# Patient Record
Sex: MALE | Race: BLACK OR AFRICAN AMERICAN | NOT HISPANIC OR LATINO | Employment: OTHER | URBAN - METROPOLITAN AREA
[De-identification: names, ages, dates, MRNs, and addresses within clinical notes are randomized per-mention and may not be internally consistent; named-entity substitution may affect disease eponyms.]

---

## 2022-02-07 ENCOUNTER — HOSPITAL ENCOUNTER (INPATIENT)
Facility: HOSPITAL | Age: 72
LOS: 3 days | Discharge: HOME | End: 2022-02-10
Attending: EMERGENCY MEDICINE | Admitting: INTERNAL MEDICINE

## 2022-02-07 ENCOUNTER — APPOINTMENT (OUTPATIENT)
Dept: GENERAL RADIOLOGY | Facility: HOSPITAL | Age: 72
End: 2022-02-07

## 2022-02-07 ENCOUNTER — APPOINTMENT (OUTPATIENT)
Dept: CT IMAGING | Facility: HOSPITAL | Age: 72
End: 2022-02-07

## 2022-02-07 DIAGNOSIS — I26.94 MULTIPLE SUBSEGMENTAL PULMONARY EMBOLI WITHOUT ACUTE COR PULMONALE: Primary | ICD-10-CM

## 2022-02-07 DIAGNOSIS — J12.82 PNEUMONIA DUE TO COVID-19 VIRUS: ICD-10-CM

## 2022-02-07 DIAGNOSIS — U07.1 PNEUMONIA DUE TO COVID-19 VIRUS: ICD-10-CM

## 2022-02-07 LAB
ALBUMIN SERPL-MCNC: 3.2 G/DL (ref 3.5–5.2)
ALBUMIN/GLOB SERPL: 0.8 G/DL
ALP SERPL-CCNC: 61 U/L (ref 39–117)
ALT SERPL W P-5'-P-CCNC: 14 U/L (ref 1–41)
ANION GAP SERPL CALCULATED.3IONS-SCNC: 8 MMOL/L (ref 5–15)
APTT PPP: 152.8 SECONDS (ref 22.7–35.4)
APTT PPP: 31.8 SECONDS (ref 22.7–35.4)
AST SERPL-CCNC: 17 U/L (ref 1–40)
B PARAPERT DNA SPEC QL NAA+PROBE: NOT DETECTED
B PERT DNA SPEC QL NAA+PROBE: NOT DETECTED
BASOPHILS # BLD AUTO: 0.03 10*3/MM3 (ref 0–0.2)
BASOPHILS NFR BLD AUTO: 1 % (ref 0–1.5)
BILIRUB SERPL-MCNC: 0.8 MG/DL (ref 0–1.2)
BUN SERPL-MCNC: 14 MG/DL (ref 8–23)
BUN/CREAT SERPL: 8.6 (ref 7–25)
C PNEUM DNA NPH QL NAA+NON-PROBE: NOT DETECTED
CALCIUM SPEC-SCNC: 9.2 MG/DL (ref 8.6–10.5)
CHLORIDE SERPL-SCNC: 101 MMOL/L (ref 98–107)
CO2 SERPL-SCNC: 27 MMOL/L (ref 22–29)
CREAT SERPL-MCNC: 1.63 MG/DL (ref 0.76–1.27)
D DIMER PPP FEU-MCNC: 2.02 MCGFEU/ML (ref 0–0.49)
D-LACTATE SERPL-SCNC: 1.1 MMOL/L (ref 0.5–2)
DEPRECATED RDW RBC AUTO: 43.3 FL (ref 37–54)
EOSINOPHIL # BLD AUTO: 0.02 10*3/MM3 (ref 0–0.4)
EOSINOPHIL NFR BLD AUTO: 0.7 % (ref 0.3–6.2)
ERYTHROCYTE [DISTWIDTH] IN BLOOD BY AUTOMATED COUNT: 14.2 % (ref 12.3–15.4)
FLUAV SUBTYP SPEC NAA+PROBE: NOT DETECTED
FLUBV RNA ISLT QL NAA+PROBE: NOT DETECTED
GFR SERPL CREATININE-BSD FRML MDRD: 50 ML/MIN/1.73
GLOBULIN UR ELPH-MCNC: 4 GM/DL
GLUCOSE SERPL-MCNC: 95 MG/DL (ref 65–99)
HADV DNA SPEC NAA+PROBE: NOT DETECTED
HCOV 229E RNA SPEC QL NAA+PROBE: NOT DETECTED
HCOV HKU1 RNA SPEC QL NAA+PROBE: NOT DETECTED
HCOV NL63 RNA SPEC QL NAA+PROBE: NOT DETECTED
HCOV OC43 RNA SPEC QL NAA+PROBE: NOT DETECTED
HCT VFR BLD AUTO: 40 % (ref 37.5–51)
HGB BLD-MCNC: 13.3 G/DL (ref 13–17.7)
HMPV RNA NPH QL NAA+NON-PROBE: NOT DETECTED
HPIV1 RNA ISLT QL NAA+PROBE: NOT DETECTED
HPIV2 RNA SPEC QL NAA+PROBE: NOT DETECTED
HPIV3 RNA NPH QL NAA+PROBE: NOT DETECTED
HPIV4 P GENE NPH QL NAA+PROBE: NOT DETECTED
IMM GRANULOCYTES # BLD AUTO: 0.01 10*3/MM3 (ref 0–0.05)
IMM GRANULOCYTES NFR BLD AUTO: 0.3 % (ref 0–0.5)
INR PPP: 1 (ref 0.9–1.1)
LYMPHOCYTES # BLD AUTO: 0.56 10*3/MM3 (ref 0.7–3.1)
LYMPHOCYTES NFR BLD AUTO: 19.4 % (ref 19.6–45.3)
M PNEUMO IGG SER IA-ACNC: NOT DETECTED
MCH RBC QN AUTO: 28.4 PG (ref 26.6–33)
MCHC RBC AUTO-ENTMCNC: 33.3 G/DL (ref 31.5–35.7)
MCV RBC AUTO: 85.3 FL (ref 79–97)
MONOCYTES # BLD AUTO: 0.4 10*3/MM3 (ref 0.1–0.9)
MONOCYTES NFR BLD AUTO: 13.9 % (ref 5–12)
NEUTROPHILS NFR BLD AUTO: 1.86 10*3/MM3 (ref 1.7–7)
NEUTROPHILS NFR BLD AUTO: 64.7 % (ref 42.7–76)
NRBC BLD AUTO-RTO: 0 /100 WBC (ref 0–0.2)
NT-PROBNP SERPL-MCNC: 589 PG/ML (ref 0–900)
PLATELET # BLD AUTO: 165 10*3/MM3 (ref 140–450)
PMV BLD AUTO: 10.5 FL (ref 6–12)
POTASSIUM SERPL-SCNC: 4.3 MMOL/L (ref 3.5–5.2)
PROCALCITONIN SERPL-MCNC: 0.23 NG/ML (ref 0–0.25)
PROT SERPL-MCNC: 7.2 G/DL (ref 6–8.5)
PROTHROMBIN TIME: 13.1 SECONDS (ref 11.7–14.2)
QT INTERVAL: 368 MS
RBC # BLD AUTO: 4.69 10*6/MM3 (ref 4.14–5.8)
RHINOVIRUS RNA SPEC NAA+PROBE: NOT DETECTED
RSV RNA NPH QL NAA+NON-PROBE: NOT DETECTED
SARS-COV-2 RNA NPH QL NAA+NON-PROBE: DETECTED
SODIUM SERPL-SCNC: 136 MMOL/L (ref 136–145)
TROPONIN T SERPL-MCNC: <0.01 NG/ML (ref 0–0.03)
WBC NRBC COR # BLD: 2.88 10*3/MM3 (ref 3.4–10.8)

## 2022-02-07 PROCEDURE — 84484 ASSAY OF TROPONIN QUANT: CPT | Performed by: EMERGENCY MEDICINE

## 2022-02-07 PROCEDURE — 83880 ASSAY OF NATRIURETIC PEPTIDE: CPT | Performed by: EMERGENCY MEDICINE

## 2022-02-07 PROCEDURE — 85730 THROMBOPLASTIN TIME PARTIAL: CPT | Performed by: INTERNAL MEDICINE

## 2022-02-07 PROCEDURE — 71275 CT ANGIOGRAPHY CHEST: CPT

## 2022-02-07 PROCEDURE — 87040 BLOOD CULTURE FOR BACTERIA: CPT | Performed by: EMERGENCY MEDICINE

## 2022-02-07 PROCEDURE — 25010000002 HEPARIN (PORCINE) 25000-0.45 UT/250ML-% SOLUTION: Performed by: EMERGENCY MEDICINE

## 2022-02-07 PROCEDURE — 85730 THROMBOPLASTIN TIME PARTIAL: CPT | Performed by: EMERGENCY MEDICINE

## 2022-02-07 PROCEDURE — 0 IOPAMIDOL PER 1 ML: Performed by: EMERGENCY MEDICINE

## 2022-02-07 PROCEDURE — 0202U NFCT DS 22 TRGT SARS-COV-2: CPT | Performed by: EMERGENCY MEDICINE

## 2022-02-07 PROCEDURE — 71045 X-RAY EXAM CHEST 1 VIEW: CPT

## 2022-02-07 PROCEDURE — 25010000002 HEPARIN (PORCINE) PER 1000 UNITS: Performed by: EMERGENCY MEDICINE

## 2022-02-07 PROCEDURE — 85025 COMPLETE CBC W/AUTO DIFF WBC: CPT | Performed by: EMERGENCY MEDICINE

## 2022-02-07 PROCEDURE — 85379 FIBRIN DEGRADATION QUANT: CPT | Performed by: EMERGENCY MEDICINE

## 2022-02-07 PROCEDURE — 99285 EMERGENCY DEPT VISIT HI MDM: CPT

## 2022-02-07 PROCEDURE — 85610 PROTHROMBIN TIME: CPT | Performed by: EMERGENCY MEDICINE

## 2022-02-07 PROCEDURE — 83605 ASSAY OF LACTIC ACID: CPT | Performed by: EMERGENCY MEDICINE

## 2022-02-07 PROCEDURE — 93010 ELECTROCARDIOGRAM REPORT: CPT | Performed by: INTERNAL MEDICINE

## 2022-02-07 PROCEDURE — 84145 PROCALCITONIN (PCT): CPT | Performed by: EMERGENCY MEDICINE

## 2022-02-07 PROCEDURE — 93005 ELECTROCARDIOGRAM TRACING: CPT | Performed by: EMERGENCY MEDICINE

## 2022-02-07 PROCEDURE — 80053 COMPREHEN METABOLIC PANEL: CPT | Performed by: EMERGENCY MEDICINE

## 2022-02-07 RX ORDER — NITROGLYCERIN 0.4 MG/1
0.4 TABLET SUBLINGUAL
Status: DISCONTINUED | OUTPATIENT
Start: 2022-02-07 | End: 2022-02-10 | Stop reason: HOSPADM

## 2022-02-07 RX ORDER — HEPARIN SODIUM 10000 [USP'U]/100ML
18 INJECTION, SOLUTION INTRAVENOUS
Status: DISCONTINUED | OUTPATIENT
Start: 2022-02-07 | End: 2022-02-09

## 2022-02-07 RX ORDER — UREA 10 %
3 LOTION (ML) TOPICAL NIGHTLY PRN
Status: DISCONTINUED | OUTPATIENT
Start: 2022-02-07 | End: 2022-02-10 | Stop reason: HOSPADM

## 2022-02-07 RX ORDER — AMLODIPINE BESYLATE 10 MG/1
10 TABLET ORAL DAILY
COMMUNITY

## 2022-02-07 RX ORDER — HEPARIN SODIUM 5000 [USP'U]/ML
80 INJECTION, SOLUTION INTRAVENOUS; SUBCUTANEOUS ONCE
Status: COMPLETED | OUTPATIENT
Start: 2022-02-07 | End: 2022-02-07

## 2022-02-07 RX ORDER — ONDANSETRON 4 MG/1
4 TABLET, FILM COATED ORAL EVERY 6 HOURS PRN
Status: DISCONTINUED | OUTPATIENT
Start: 2022-02-07 | End: 2022-02-10 | Stop reason: HOSPADM

## 2022-02-07 RX ORDER — ACETAMINOPHEN 325 MG/1
650 TABLET ORAL EVERY 4 HOURS PRN
Status: DISCONTINUED | OUTPATIENT
Start: 2022-02-07 | End: 2022-02-10 | Stop reason: HOSPADM

## 2022-02-07 RX ORDER — PANTOPRAZOLE SODIUM 40 MG/1
40 TABLET, DELAYED RELEASE ORAL
COMMUNITY

## 2022-02-07 RX ORDER — ONDANSETRON 2 MG/ML
4 INJECTION INTRAMUSCULAR; INTRAVENOUS EVERY 6 HOURS PRN
Status: DISCONTINUED | OUTPATIENT
Start: 2022-02-07 | End: 2022-02-10 | Stop reason: HOSPADM

## 2022-02-07 RX ORDER — SODIUM CHLORIDE 0.9 % (FLUSH) 0.9 %
10 SYRINGE (ML) INJECTION AS NEEDED
Status: DISCONTINUED | OUTPATIENT
Start: 2022-02-07 | End: 2022-02-10 | Stop reason: HOSPADM

## 2022-02-07 RX ORDER — SENNA PLUS 8.6 MG/1
2 TABLET ORAL 2 TIMES DAILY
Status: DISCONTINUED | OUTPATIENT
Start: 2022-02-07 | End: 2022-02-10 | Stop reason: HOSPADM

## 2022-02-07 RX ORDER — ERGOCALCIFEROL 1.25 MG/1
50000 CAPSULE ORAL WEEKLY
COMMUNITY

## 2022-02-07 RX ORDER — HEPARIN SODIUM 5000 [USP'U]/ML
40-80 INJECTION, SOLUTION INTRAVENOUS; SUBCUTANEOUS EVERY 6 HOURS PRN
Status: DISCONTINUED | OUTPATIENT
Start: 2022-02-07 | End: 2022-02-09

## 2022-02-07 RX ADMIN — HEPARIN SODIUM 18 UNITS/KG/HR: 10000 INJECTION, SOLUTION INTRAVENOUS at 15:00

## 2022-02-07 RX ADMIN — SENNOSIDES 2 TABLET: 8.6 TABLET, FILM COATED ORAL at 23:22

## 2022-02-07 RX ADMIN — IOPAMIDOL 95 ML: 755 INJECTION, SOLUTION INTRAVENOUS at 13:50

## 2022-02-07 RX ADMIN — Medication 3 MG: at 23:22

## 2022-02-07 RX ADMIN — HEPARIN SODIUM 6500 UNITS: 5000 INJECTION, SOLUTION INTRAVENOUS; SUBCUTANEOUS at 14:58

## 2022-02-07 NOTE — CASE MANAGEMENT/SOCIAL WORK
Late entry- Notified by registration patient has [facility]- updated ER provider- Medicare A/VA benefits- Contacted VA line for approval #[id number] to stay at [facility]. Cheri Russ RN

## 2022-02-07 NOTE — ED NOTES
Pt wearing mask. Myself had mask, and eye wear/PPE when present with pt. Hand hygiene performed before and after pt care.     Yamile Tong, PCT  02/07/22 2861

## 2022-02-07 NOTE — ED NOTES
Pt states that his son, whom he lives with, had Covid roughly 1 month ago.  Pt states he has had a cough and increased fatigue for the past 2 weeks.     Yamila Gates, RN  02/07/22 6230

## 2022-02-07 NOTE — ED TRIAGE NOTES
Pt is soa and is tired when he gets up from sitting/laying - he feels like he's going to pass out.  He coughs c phlegm    Patient was placed in face mask during first look triage.  Patient was wearing a face mask throughout encounter.  I wore personal protective equipment throughout the encounter.  Hand hygiene was performed before and after patient encounter.

## 2022-02-07 NOTE — ED NOTES
Updated patient's wife.  Patient's wife asked if she could come and see her  through a window.  I notified the wife that Covid + patients are not allowed visitors.  Wife stated that a family member who was Covid + was allowed to have a visitor look at her through a window and pass notes back and forth.  I explained to the wife that I was not sure what that circumstance was, but at this point her  cannot have visitors.    Wife requests that staff help him charge and use his cell phone.     Yamila Gates RN  02/07/22 [id number]

## 2022-02-07 NOTE — ED PROVIDER NOTES
EMERGENCY DEPARTMENT ENCOUNTER  I wore full protective equipment throughout this patient encounter including a N95 mask, eye shield, gown and gloves. Hand hygiene was performed before donning protective equipment and after removal when leaving the room.    Room Number:  40/40  Date of encounter:  2/7/2022  PCP: Provider, No Known    HPI:  Context: David Nugent is a 72 y.o. male who presents to the ED c/o chief complaint of increasing dyspnea on exertion for the past 3 weeks.  Patient denies history of congestive heart failure but he has noted increasing shortness of breath when he goes to the grocery.  He denies chest pain or diaphoresis with exertion.  He is also noted for the past 2 weeks, a cough productive of yellow sputum.  He denies leg pain or leg swelling.  He states he did have a pulmonary embolism last year and is no longer on blood thinners.  He states this does not feel like the last time he had a PE.  Patient states he is not immunized against SARS-CoV-2.  He does admit for 2 weeks of subjective chills and night sweats.  He states he is here today to make sure he does not have pneumonia or pulmonary embolism.    MEDICAL HISTORY REVIEW  Reviewed in [facility]    PAST MEDICAL HISTORY  Active Ambulatory Problems     Diagnosis Date Noted   • No Active Ambulatory Problems     Resolved Ambulatory Problems     Diagnosis Date Noted   • No Resolved Ambulatory Problems     No Additional Past Medical History       PAST SURGICAL HISTORY  No past surgical history on file.    FAMILY HISTORY  No family history on file.    SOCIAL HISTORY  Social History     Socioeconomic History   • Marital status:        ALLERGIES  Patient has no known allergies.    The patient's allergies have been reviewed    REVIEW OF SYSTEMS  All systems reviewed and negative except for those discussed in HPI.     PHYSICAL EXAM  I have reviewed the triage vital signs and nursing notes.  ED Triage Vitals   Temp Heart Rate Resp BP SpO2   02/07/22  1022 02/07/22 1022 02/07/22 1022 02/07/22 1121 02/07/22 1022   97.7 °F (36.5 °C) 83 16 141/83 94 %      Temp src Heart Rate Source Patient Position BP Location FiO2 (%)   02/07/22 1022 02/07/22 1022 02/07/22 1121 02/07/22 1121 --   Tympanic Monitor Lying Right arm        General: Mild distress.  HENT: NCAT, PERRL, Nares patent.  Eyes: no scleral icterus.  Extraocular movements are intact.    Neck: trachea midline, no ROM limitations.  No lymphadenopathy or JVD.  CV: regular rhythm, regular rate.  Respiratory: normal effort, right basilar rhonchi are noted.  Abdomen: soft, nondistended, NTTP, no rebound tenderness, no guarding or rigidity.  Musculoskeletal: no deformity.  No edema or calf tenderness noted.    Neuro: alert, moves all extremities, follows commands.  Skin: warm, dry.    LAB RESULTS  Recent Results (from the past 24 hour(s))   Respiratory Panel PCR w/COVID-19(SARS-CoV-2) RODGER/LYNNE/JASON/PAD/COR/MAD/DURAN In-House, NP Swab in UTM/VTM, 3-4 HR TAT - Swab, Nasopharynx    Collection Time: 02/07/22 11:23 AM    Specimen: Nasopharynx; Swab   Result Value Ref Range    ADENOVIRUS, PCR Not Detected Not Detected    Coronavirus 229E Not Detected Not Detected    Coronavirus HKU1 Not Detected Not Detected    Coronavirus NL63 Not Detected Not Detected    Coronavirus OC43 Not Detected Not Detected    COVID19 Detected (C) Not Detected - Ref. Range    Human Metapneumovirus Not Detected Not Detected    Human Rhinovirus/Enterovirus Not Detected Not Detected    Influenza A PCR Not Detected Not Detected    Influenza B PCR Not Detected Not Detected    Parainfluenza Virus 1 Not Detected Not Detected    Parainfluenza Virus 2 Not Detected Not Detected    Parainfluenza Virus 3 Not Detected Not Detected    Parainfluenza Virus 4 Not Detected Not Detected    RSV, PCR Not Detected Not Detected    Bordetella pertussis pcr Not Detected Not Detected    Bordetella parapertussis PCR Not Detected Not Detected    Chlamydophila pneumoniae PCR Not  Detected Not Detected    Mycoplasma pneumo by PCR Not Detected Not Detected   Comprehensive Metabolic Panel    Collection Time: 02/07/22 11:34 AM    Specimen: Blood   Result Value Ref Range    Glucose 95 65 - 99 mg/dL    BUN 14 8 - 23 mg/dL    Creatinine 1.63 (H) 0.76 - 1.27 mg/dL    Sodium 136 136 - 145 mmol/L    Potassium 4.3 3.5 - 5.2 mmol/L    Chloride 101 98 - 107 mmol/L    CO2 27.0 22.0 - 29.0 mmol/L    Calcium 9.2 8.6 - 10.5 mg/dL    Total Protein 7.2 6.0 - 8.5 g/dL    Albumin 3.20 (L) 3.50 - 5.20 g/dL    ALT (SGPT) 14 1 - 41 U/L    AST (SGOT) 17 1 - 40 U/L    Alkaline Phosphatase 61 39 - 117 U/L    Total Bilirubin 0.8 0.0 - 1.2 mg/dL    eGFR  African Amer 50 (L) >60 mL/min/1.73    Globulin 4.0 gm/dL    A/G Ratio 0.8 g/dL    BUN/Creatinine Ratio 8.6 7.0 - 25.0    Anion Gap 8.0 5.0 - 15.0 mmol/L   Procalcitonin    Collection Time: 02/07/22 11:34 AM    Specimen: Blood   Result Value Ref Range    Procalcitonin 0.23 0.00 - 0.25 ng/mL   D-dimer, Quantitative    Collection Time: 02/07/22 11:34 AM    Specimen: Blood   Result Value Ref Range    D-Dimer, Quantitative 2.02 (H) 0.00 - 0.49 MCGFEU/mL   Lactic Acid, Plasma    Collection Time: 02/07/22 11:34 AM    Specimen: Blood   Result Value Ref Range    Lactate 1.1 0.5 - 2.0 mmol/L   Troponin    Collection Time: 02/07/22 11:34 AM    Specimen: Blood   Result Value Ref Range    Troponin T <0.010 0.000 - 0.030 ng/mL   BNP    Collection Time: 02/07/22 11:34 AM    Specimen: Blood   Result Value Ref Range    proBNP 589.0 0.0 - 900.0 pg/mL   CBC Auto Differential    Collection Time: 02/07/22 11:34 AM    Specimen: Blood   Result Value Ref Range    WBC 2.88 (L) 3.40 - 10.80 10*3/mm3    RBC 4.69 4.14 - 5.80 10*6/mm3    Hemoglobin 13.3 13.0 - 17.7 g/dL    Hematocrit 40.0 37.5 - 51.0 %    MCV 85.3 79.0 - 97.0 fL    MCH 28.4 26.6 - 33.0 pg    MCHC 33.3 31.5 - 35.7 g/dL    RDW 14.2 12.3 - 15.4 %    RDW-SD 43.3 37.0 - 54.0 fl    MPV 10.5 6.0 - 12.0 fL    Platelets 165 140 - 450  10*3/mm3    Neutrophil % 64.7 42.7 - 76.0 %    Lymphocyte % 19.4 (L) 19.6 - 45.3 %    Monocyte % 13.9 (H) 5.0 - 12.0 %    Eosinophil % 0.7 0.3 - 6.2 %    Basophil % 1.0 0.0 - 1.5 %    Immature Grans % 0.3 0.0 - 0.5 %    Neutrophils, Absolute 1.86 1.70 - 7.00 10*3/mm3    Lymphocytes, Absolute 0.56 (L) 0.70 - 3.10 10*3/mm3    Monocytes, Absolute 0.40 0.10 - 0.90 10*3/mm3    Eosinophils, Absolute 0.02 0.00 - 0.40 10*3/mm3    Basophils, Absolute 0.03 0.00 - 0.20 10*3/mm3    Immature Grans, Absolute 0.01 0.00 - 0.05 10*3/mm3    nRBC 0.0 0.0 - 0.2 /100 WBC   ECG 12 Lead    Collection Time: 02/07/22 12:56 PM   Result Value Ref Range    QT Interval 368 ms       I ordered the above labs and reviewed the results.    RADIOLOGY  CT Angiogram Chest    Result Date: 2/7/2022  CT ANGIOGRAM OF THE CHEST. MULTIPLE CORONAL, SAGITTAL, AND 3D RECONSTRUCTIONS  HISTORY: 72-year-old male with increasing dyspnea. Pulmonary thromboemboli history in the past.  TECHNIQUE: Radiation dose reduction techniques were utilized, including automated exposure control and exposure modulation based on body size. CT angiogram of the chest was performed following the administration of IV contrast. Multiple coronal, sagittal, and 3D reconstruction images were obtained. There is no previous CT for comparison.  FINDINGS: There is adequate opacification of the pulmonary arteries and there are multiple small pulmonary thromboemboli. The largest pulmonary thromboembolus is within the lobar right upper lobe pulmonary artery extending into segmental branches. This thromboembolus is best seen on the coronal reconstruction series. Segmental lingular pulmonary artery and branches. There are segmental right and lower lobe pulmonary thromboemboli and subsegmental pulmonary thromboemboli at the left lower lobe. The RV:LV ratio is less than 1 (2.3 cm/2.5 cm). There are mixed density airspace consolidations at the right lower lobe and to a lesser degree the left lower  lobe. There are also predominantly peripheral airspace opacities at the right upper lobe and faint ground glass density within the lingula. There is a small right pleural effusion and a tiny left pleural effusion. There is a small pericardial effusion as well. There is no pathologic-appearing lymphadenopathy within the chest. The esophagus appears patulous. There is an approximately 3 cm subcutaneous low-attenuation lesion at the midline of the mid chest and there is also significant thickening of the skin throughout much of the anterior upper and mid chest.      1. There are bilateral pulmonary thromboemboli. The largest is within the lobar right upper lobe pulmonary artery. RV:LV ratio is less than 1. 2. The right lower lobe airspace consolidations have an appearance most suspicious for sequela of pulmonary thromboemboli. The ground glass opacities present bilaterally are suspected to represent COVID pneumonia. 3. Small pericardial effusion. Small right pleural effusion and tiny left pleural effusion. Patulous appearance of the esophagus. Query history of achalasia and esophageal dysmotility. 4. Significant thickening of the skin at the anterior aspect of the upper and mid chest and an approximately 3 cm subcutaneous lesion at the midline subcutaneous tissues of the mid chest. The rounded lesion may represent a complex sebaceous cyst, but correlate the skin thickening clinically.  Discussed with Dr. Pisano.        XR Chest AP    Result Date: 2/7/2022  XR CHEST AP-  INDICATIONS: Cough and fever, Covid evaluation  TECHNIQUE: Frontal view of the chest  COMPARISON: None available  FINDINGS:  The heart size is borderline. Pulmonary vasculature is unremarkable. Aorta is tortuous. Minimal patchy density at the right midlung could be an area of developing pneumonia, follow-up suggested. No pleural effusion, or pneumothorax. No acute osseous process.       Minimal patchy density at the right midlung could be an area of  developing pneumonia, follow-up suggested. Tortuous aorta.  This report was finalized on 2/7/2022 12:00 PM by Dr. Simba Olivares M.D.        I ordered the above noted radiological studies. I reviewed the images and results. I agree with the radiologist interpretation.    PROCEDURES  Critical Care  Performed by: Alberto Pisano MD  Authorized by: Alberto Pisano MD     Critical care provider statement:     Critical care time (minutes):  40    Critical care time was exclusive of:  Separately billable procedures and treating other patients    Critical care was necessary to treat or prevent imminent or life-threatening deterioration of the following conditions:  Respiratory failure    Critical care was time spent personally by me on the following activities:  Development of treatment plan with patient or surrogate, discussions with primary provider, evaluation of patient's response to treatment, examination of patient, interpretation of cardiac output measurements, ordering and review of laboratory studies, ordering and review of radiographic studies, pulse oximetry, re-evaluation of patient's condition, review of old charts and obtaining history from patient or surrogate        MEDICATIONS GIVEN IN ER  Medications   sodium chloride 0.9 % flush 10 mL (has no administration in time range)   heparin (porcine) 5000 UNIT/ML injection 6,500 Units (has no administration in time range)   heparin 50363 units/250 mL (100 units/mL) in 0.45 % NaCl infusion (has no administration in time range)   heparin (porcine) 5000 UNIT/ML injection 3,300-6,500 Units (has no administration in time range)   iopamidol (ISOVUE-370) 76 % injection 95 mL (95 mL Intravenous Given by Other 2/7/22 1350)       PROGRESS, DATA ANALYSIS, CONSULTS, AND MEDICAL DECISION MAKING  A complete history and physical exam have been performed.  All available laboratory and imaging results have been reviewed by myself prior to disposition.    MDM  After the  initial H&P, I discussed pertinent information from history and physical exam with patient/family.  Discussed differential diagnosis.  Discussed plan for ED evaluation/workup/treatment.  All questions answered.  Patient/family is agreeable with plan.  ED Course as of 02/07/22 1506   Mon Feb 07, 2022   1205 Patient presents with increasing shortness of air, cough productive yellow sputum and night sweats.  We will check for sepsis, congestive heart failure and we will check patient for SARS-CoV-2 amongst other viral syndromes. [DE]   1210 Lactate: 1.1 [DE]   1210 proBNP: 589.0 [DE]   1210 Troponin T: <0.010 [DE]   1227 D-Dimer, Quant(!): 2.02 [DE]   1323 EKG          EKG time: 1256  Rhythm/Rate: Normal sinus rhythm, rate 77  P waves and MS: Occasional PVC, first-degree AV block  QRS, axis: normal   ST and T waves: Nonspecific T wave changes    Interpreted Contemporaneously by me, independently viewed  No previous EKG   [DE]   1341 COVID19(!!): Detected [DE]   1356 I have updated patient that he does have COVID-19 and Covid pneumonia.  His oxygen saturations are 94 to 97% on room air.  We are waiting for the results of patient's CT of the chest.  If it is negative for a pulmonary embolism, patient will be discharged home. [DE]   1429 I discussed the case with Dr. Cogan, radiology.  Patient has bilateral pulmonary emboli with an RV to LV ratio less than one.  He has a right lower lobe consolidation with suggest pulmonary infarction.  He also has peripheral groundglass opacities in right middle and right upper lobe which may be Covid pneumonia. [DE]   1430 Patient has Covid pneumonia and bilateral pulmonary emboli.  Patient is too unstable to be transferred.  Will admit patient to the hospital here for further evaluation and treatment. [DE]   1504 I discussed the case with Dr. Noyola with [facility].  She is going admit patient to the hospital for further evaluation and treatment. [DE]      ED Course User Index  [DE] Norris  Alberto RODRIGUEZ MD       AS OF 15:06 EST VITALS:    BP - 131/72  HR - 80  TEMP - 98.7 °F (37.1 °C) (Oral)  O2 SATS - 93%    DIAGNOSIS  Final diagnoses:   Multiple subsegmental pulmonary emboli without acute cor pulmonale (HCC)   Pneumonia due to COVID-19 virus         DISPOSITION  ADMISSION    Discussed treatment plan and reason for admission with pt/family and admitting physician.  Pt/family voiced understanding of the plan for admission for further testing/treatment as needed.          Alberto Pisano MD  02/07/22 5551

## 2022-02-08 ENCOUNTER — APPOINTMENT (OUTPATIENT)
Dept: CARDIOLOGY | Facility: HOSPITAL | Age: 72
End: 2022-02-08

## 2022-02-08 PROBLEM — I82.4Z2: Status: ACTIVE | Noted: 2022-02-08

## 2022-02-08 PROBLEM — K21.9 GERD WITHOUT ESOPHAGITIS: Chronic | Status: ACTIVE | Noted: 2022-02-08

## 2022-02-08 PROBLEM — U07.1 PNEUMONIA DUE TO COVID-19 VIRUS: Status: ACTIVE | Noted: 2022-02-08

## 2022-02-08 PROBLEM — J12.82 PNEUMONIA DUE TO COVID-19 VIRUS: Status: ACTIVE | Noted: 2022-02-08

## 2022-02-08 PROBLEM — I10 HTN (HYPERTENSION): Chronic | Status: ACTIVE | Noted: 2022-02-08

## 2022-02-08 LAB
ANION GAP SERPL CALCULATED.3IONS-SCNC: 10.5 MMOL/L (ref 5–15)
APTT PPP: 100.4 SECONDS (ref 22.7–35.4)
APTT PPP: 165 SECONDS (ref 22.7–35.4)
APTT PPP: 76.5 SECONDS (ref 22.7–35.4)
BASOPHILS # BLD AUTO: 0.01 10*3/MM3 (ref 0–0.2)
BASOPHILS NFR BLD AUTO: 0.4 % (ref 0–1.5)
BH CV LOW VAS LEFT LESSER SAPH VESSEL: 1
BH CV LOW VAS LEFT PERONEAL VESSEL: 1
BH CV LOWER VASCULAR LEFT COMMON FEMORAL AUGMENT: NORMAL
BH CV LOWER VASCULAR LEFT COMMON FEMORAL COMPETENT: NORMAL
BH CV LOWER VASCULAR LEFT COMMON FEMORAL COMPRESS: NORMAL
BH CV LOWER VASCULAR LEFT COMMON FEMORAL PHASIC: NORMAL
BH CV LOWER VASCULAR LEFT COMMON FEMORAL SPONT: NORMAL
BH CV LOWER VASCULAR LEFT DISTAL FEMORAL COMPRESS: NORMAL
BH CV LOWER VASCULAR LEFT GASTRONEMIUS COMPRESS: NORMAL
BH CV LOWER VASCULAR LEFT GREATER SAPH AK COMPRESS: NORMAL
BH CV LOWER VASCULAR LEFT GREATER SAPH BK COMPRESS: NORMAL
BH CV LOWER VASCULAR LEFT LESSER SAPH COMPRESS: NORMAL
BH CV LOWER VASCULAR LEFT LESSER SAPH THROMBUS: NORMAL
BH CV LOWER VASCULAR LEFT MID FEMORAL AUGMENT: NORMAL
BH CV LOWER VASCULAR LEFT MID FEMORAL COMPETENT: NORMAL
BH CV LOWER VASCULAR LEFT MID FEMORAL COMPRESS: NORMAL
BH CV LOWER VASCULAR LEFT MID FEMORAL PHASIC: NORMAL
BH CV LOWER VASCULAR LEFT MID FEMORAL SPONT: NORMAL
BH CV LOWER VASCULAR LEFT PERONEAL COMPRESS: NORMAL
BH CV LOWER VASCULAR LEFT PERONEAL THROMBUS: NORMAL
BH CV LOWER VASCULAR LEFT POPLITEAL AUGMENT: NORMAL
BH CV LOWER VASCULAR LEFT POPLITEAL COMPETENT: NORMAL
BH CV LOWER VASCULAR LEFT POPLITEAL COMPRESS: NORMAL
BH CV LOWER VASCULAR LEFT POPLITEAL PHASIC: NORMAL
BH CV LOWER VASCULAR LEFT POPLITEAL SPONT: NORMAL
BH CV LOWER VASCULAR LEFT POSTERIOR TIBIAL COMPRESS: NORMAL
BH CV LOWER VASCULAR LEFT PROFUNDA FEMORAL COMPRESS: NORMAL
BH CV LOWER VASCULAR LEFT PROXIMAL FEMORAL COMPRESS: NORMAL
BH CV LOWER VASCULAR LEFT SAPHENOFEMORAL JUNCTION COMPRESS: NORMAL
BH CV LOWER VASCULAR RIGHT COMMON FEMORAL AUGMENT: NORMAL
BH CV LOWER VASCULAR RIGHT COMMON FEMORAL COMPETENT: NORMAL
BH CV LOWER VASCULAR RIGHT COMMON FEMORAL COMPRESS: NORMAL
BH CV LOWER VASCULAR RIGHT COMMON FEMORAL PHASIC: NORMAL
BH CV LOWER VASCULAR RIGHT COMMON FEMORAL SPONT: NORMAL
BH CV LOWER VASCULAR RIGHT DISTAL FEMORAL COMPRESS: NORMAL
BH CV LOWER VASCULAR RIGHT GASTRONEMIUS COMPRESS: NORMAL
BH CV LOWER VASCULAR RIGHT GREATER SAPH AK COMPRESS: NORMAL
BH CV LOWER VASCULAR RIGHT GREATER SAPH BK COMPRESS: NORMAL
BH CV LOWER VASCULAR RIGHT LESSER SAPH COMPRESS: NORMAL
BH CV LOWER VASCULAR RIGHT MID FEMORAL AUGMENT: NORMAL
BH CV LOWER VASCULAR RIGHT MID FEMORAL COMPETENT: NORMAL
BH CV LOWER VASCULAR RIGHT MID FEMORAL COMPRESS: NORMAL
BH CV LOWER VASCULAR RIGHT MID FEMORAL PHASIC: NORMAL
BH CV LOWER VASCULAR RIGHT MID FEMORAL SPONT: NORMAL
BH CV LOWER VASCULAR RIGHT PERONEAL COMPRESS: NORMAL
BH CV LOWER VASCULAR RIGHT POPLITEAL AUGMENT: NORMAL
BH CV LOWER VASCULAR RIGHT POPLITEAL COMPETENT: NORMAL
BH CV LOWER VASCULAR RIGHT POPLITEAL COMPRESS: NORMAL
BH CV LOWER VASCULAR RIGHT POPLITEAL PHASIC: NORMAL
BH CV LOWER VASCULAR RIGHT POPLITEAL SPONT: NORMAL
BH CV LOWER VASCULAR RIGHT POSTERIOR TIBIAL COMPRESS: NORMAL
BH CV LOWER VASCULAR RIGHT PROFUNDA FEMORAL COMPRESS: NORMAL
BH CV LOWER VASCULAR RIGHT PROXIMAL FEMORAL COMPRESS: NORMAL
BH CV LOWER VASCULAR RIGHT SAPHENOFEMORAL JUNCTION COMPRESS: NORMAL
BUN SERPL-MCNC: 12 MG/DL (ref 8–23)
BUN/CREAT SERPL: 10.3 (ref 7–25)
CALCIUM SPEC-SCNC: 8.8 MG/DL (ref 8.6–10.5)
CHLORIDE SERPL-SCNC: 102 MMOL/L (ref 98–107)
CO2 SERPL-SCNC: 23.5 MMOL/L (ref 22–29)
CREAT SERPL-MCNC: 1.16 MG/DL (ref 0.76–1.27)
CRP SERPL-MCNC: 5.16 MG/DL (ref 0–0.5)
DEPRECATED RDW RBC AUTO: 42.6 FL (ref 37–54)
EOSINOPHIL # BLD AUTO: 0.04 10*3/MM3 (ref 0–0.4)
EOSINOPHIL NFR BLD AUTO: 1.4 % (ref 0.3–6.2)
ERYTHROCYTE [DISTWIDTH] IN BLOOD BY AUTOMATED COUNT: 14 % (ref 12.3–15.4)
FERRITIN SERPL-MCNC: 865 NG/ML (ref 30–400)
GFR SERPL CREATININE-BSD FRML MDRD: 75 ML/MIN/1.73
GLUCOSE SERPL-MCNC: 87 MG/DL (ref 65–99)
HCT VFR BLD AUTO: 38 % (ref 37.5–51)
HGB BLD-MCNC: 12.7 G/DL (ref 13–17.7)
IMM GRANULOCYTES # BLD AUTO: 0.01 10*3/MM3 (ref 0–0.05)
IMM GRANULOCYTES NFR BLD AUTO: 0.4 % (ref 0–0.5)
LYMPHOCYTES # BLD AUTO: 0.83 10*3/MM3 (ref 0.7–3.1)
LYMPHOCYTES NFR BLD AUTO: 29.1 % (ref 19.6–45.3)
MAXIMAL PREDICTED HEART RATE: 146 BPM
MCH RBC QN AUTO: 28 PG (ref 26.6–33)
MCHC RBC AUTO-ENTMCNC: 33.4 G/DL (ref 31.5–35.7)
MCV RBC AUTO: 83.7 FL (ref 79–97)
MONOCYTES # BLD AUTO: 0.36 10*3/MM3 (ref 0.1–0.9)
MONOCYTES NFR BLD AUTO: 12.6 % (ref 5–12)
NEUTROPHILS NFR BLD AUTO: 1.6 10*3/MM3 (ref 1.7–7)
NEUTROPHILS NFR BLD AUTO: 56.1 % (ref 42.7–76)
NRBC BLD AUTO-RTO: 0 /100 WBC (ref 0–0.2)
PLATELET # BLD AUTO: 171 10*3/MM3 (ref 140–450)
PMV BLD AUTO: 11.2 FL (ref 6–12)
POTASSIUM SERPL-SCNC: 3.9 MMOL/L (ref 3.5–5.2)
RBC # BLD AUTO: 4.54 10*6/MM3 (ref 4.14–5.8)
SODIUM SERPL-SCNC: 136 MMOL/L (ref 136–145)
STRESS TARGET HR: 124 BPM
WBC NRBC COR # BLD: 2.85 10*3/MM3 (ref 3.4–10.8)

## 2022-02-08 PROCEDURE — 82728 ASSAY OF FERRITIN: CPT | Performed by: HOSPITALIST

## 2022-02-08 PROCEDURE — 80048 BASIC METABOLIC PNL TOTAL CA: CPT | Performed by: INTERNAL MEDICINE

## 2022-02-08 PROCEDURE — 36415 COLL VENOUS BLD VENIPUNCTURE: CPT | Performed by: EMERGENCY MEDICINE

## 2022-02-08 PROCEDURE — 99255 IP/OBS CONSLTJ NEW/EST HI 80: CPT | Performed by: INTERNAL MEDICINE

## 2022-02-08 PROCEDURE — 85730 THROMBOPLASTIN TIME PARTIAL: CPT | Performed by: HOSPITALIST

## 2022-02-08 PROCEDURE — 86140 C-REACTIVE PROTEIN: CPT | Performed by: HOSPITALIST

## 2022-02-08 PROCEDURE — 25010000002 HEPARIN (PORCINE) 25000-0.45 UT/250ML-% SOLUTION: Performed by: EMERGENCY MEDICINE

## 2022-02-08 PROCEDURE — 85025 COMPLETE CBC W/AUTO DIFF WBC: CPT | Performed by: EMERGENCY MEDICINE

## 2022-02-08 PROCEDURE — 85730 THROMBOPLASTIN TIME PARTIAL: CPT | Performed by: EMERGENCY MEDICINE

## 2022-02-08 PROCEDURE — 93970 EXTREMITY STUDY: CPT

## 2022-02-08 RX ORDER — DEXAMETHASONE 6 MG/1
6 TABLET ORAL
Status: DISCONTINUED | OUTPATIENT
Start: 2022-02-08 | End: 2022-02-10 | Stop reason: HOSPADM

## 2022-02-08 RX ORDER — PANTOPRAZOLE SODIUM 40 MG/1
40 TABLET, DELAYED RELEASE ORAL
Status: DISCONTINUED | OUTPATIENT
Start: 2022-02-09 | End: 2022-02-10 | Stop reason: HOSPADM

## 2022-02-08 RX ADMIN — HEPARIN SODIUM 12 UNITS/KG/HR: 10000 INJECTION, SOLUTION INTRAVENOUS at 12:11

## 2022-02-08 RX ADMIN — SENNOSIDES 2 TABLET: 8.6 TABLET, FILM COATED ORAL at 20:22

## 2022-02-08 RX ADMIN — SENNOSIDES 2 TABLET: 8.6 TABLET, FILM COATED ORAL at 08:45

## 2022-02-08 RX ADMIN — DEXAMETHASONE 6 MG: 6 TABLET ORAL at 18:18

## 2022-02-08 NOTE — PROGRESS NOTES
Discharge Planning Assessment  [facility]     Patient Name: David Nugent  MRN: [id number]  Today's Date: 2/8/2022    Admit Date: 2/7/2022     Discharge Needs Assessment     Row Name 02/08/22 1548       Living Environment    Lives With spouse    Name(s) of Who Lives With Patient spouse, Cheri Nugent [phone]    Current Living Arrangements home/apartment/condo    Provides Primary Care For no one    Family Caregiver if Needed none       Resource/Environmental Concerns    Resource/Environmental Concerns none    Transportation Concerns car, none       Transition Planning    Patient/Family Anticipates Transition to home    Patient/Family Anticipated Services at Transition none    Transportation Anticipated family or friend will provide       Discharge Needs Assessment    Readmission Within the Last 30 Days no previous admission in last 30 days    Equipment Currently Used at Home none    Concerns to be Addressed denies needs/concerns at this time    Anticipated Changes Related to Illness none    Equipment Needed After Discharge none    Provided Post Acute Provider List? Refused    Provided Post Acute Provider Quality & Resource List? Refused    Patient's Choice of Community Agency(s) patient declines information for HH or snu, his plan is home with family and declines referrals               Discharge Plan     Row Name 02/08/22 5408       Plan    Plan Home with his spouse    Plan Comments Facesheet information was verified with patient via a phone call interview to screen.  He states he does not know his PCP because [facility] but [facility] Dr Prado.  He will use [facility] for new medications.  He lives with his spouse, Cheri Nugent [phone].  He states family will transport at [place].  He has no DME and denies needs for any equipment at DC. He denies dc needs...............Deandra Najera RN              Continued Care and Services - Admitted Since 2/7/2022    Coordination has not been started  for this encounter.       Expected Discharge Date and Time     Expected Discharge Date Expected Discharge Time    Feb 10, 2022          Demographic Summary     Row Name 02/08/22 1546       General Information    Admission Type inpatient    Arrived From home    Required Notices Provided Important Message from Medicare    Referral Source admission list    Preferred Language English       Contact Information    Permission Granted to Share Info With ; family/designee    Contact Information Comments spouse, Cheri Nugent [phone]               Functional Status     Row Name 02/08/22 1547       Functional Status    Usual Activity Tolerance good    Current Activity Tolerance moderate       Functional Status, IADL    Medications independent    Meal Preparation independent    Housekeeping independent    Laundry independent    Shopping independent       Mental Status Summary    Recent Changes in Mental Status/Cognitive Functioning no changes       Employment/    Employment Status retired               Psychosocial    No documentation.                Abuse/Neglect    No documentation.                Legal    No documentation.                Substance Abuse    No documentation.                Patient Forms    No documentation.                   Deandra Najera RN

## 2022-02-08 NOTE — PROGRESS NOTES
Name: David Nugent ADMIT: 2022   : 1950  PCP: Provider, No Known    MRN: [id number] LOS: 1 days   AGE/SEX: 72 y.o. male  ROOM: 18/     Subjective   Subjective   Feeling a little better today. Did have some SOA overnight. Today denies any SOA at rest, but still gets some ANN. No chest pain. Still coughing up yellow sputum--reports it is streaked with blood at times. Tolerating diet. Voiding without issue.   No N/V/D/F/C/NS/HA/vis changes/abd pain/LUTS.    Review of Systems   as above  Objective   Objective   Vital Signs  Temp:  [97.4 °F (36.3 °C)-98.5 °F (36.9 °C)] 97.4 °F (36.3 °C)  Heart Rate:  [67-92] 71  Resp:  [16] 16  BP: (126-143)/(58-86) 127/58  SpO2:  [90 %-98 %] 90 %  on   ;   Device (Oxygen Therapy): room air  Body mass index is 28.91 kg/m².  Physical Exam  Vitals and nursing note reviewed.   Constitutional:       General: He is not in acute distress.     Appearance: He is not ill-appearing, toxic-appearing or diaphoretic.   HENT:      Head: Normocephalic and atraumatic.      Right Ear: External ear normal.      Left Ear: External ear normal.      Nose: Nose normal.      Mouth/Throat:      Mouth: Mucous membranes are moist.      Pharynx: Oropharynx is clear.   Eyes:      General: No scleral icterus.        Right eye: No discharge.         Left eye: No discharge.      Extraocular Movements: Extraocular movements intact.      Conjunctiva/sclera: Conjunctivae normal.   Cardiovascular:      Rate and Rhythm: Normal rate and regular rhythm.      Heart sounds: Normal heart sounds.   Pulmonary:      Effort: Pulmonary effort is normal. No respiratory distress.      Breath sounds: Normal breath sounds. No wheezing.   Abdominal:      General: Bowel sounds are normal. There is no distension.      Palpations: Abdomen is soft.      Tenderness: There is no abdominal tenderness.   Musculoskeletal:         General: No swelling or deformity. Normal range of motion.      Cervical back: Neck supple. No  rigidity.   Lymphadenopathy:      Cervical: No cervical adenopathy.   Skin:     General: Skin is warm and dry.      Capillary Refill: Capillary refill takes less than 2 seconds.      Coloration: Skin is not jaundiced.      Findings: No rash.   Neurological:      General: No focal deficit present.      Mental Status: He is alert and oriented to person, place, and time. Mental status is at baseline.      Cranial Nerves: No cranial nerve deficit.      Coordination: Coordination normal.   Psychiatric:         Mood and Affect: Mood normal.         Behavior: Behavior normal.         Thought Content: Thought content normal.      Comments: Very pleasant         Results Review     I reviewed the patient's new clinical results.  Results from last 7 days   Lab Units 02/08/22  0613 02/07/22  1134   WBC 10*3/mm3 2.85* 2.88*   HEMOGLOBIN g/dL 12.7* 13.3   PLATELETS 10*3/mm3 171 165     Results from last 7 days   Lab Units 02/08/22  0613 02/07/22  1134   SODIUM mmol/L 136 136   POTASSIUM mmol/L 3.9 4.3   CHLORIDE mmol/L 102 101   CO2 mmol/L 23.5 27.0   BUN mg/dL 12 14   CREATININE mg/dL 1.16 1.63*   GLUCOSE mg/dL 87 95   EGFR IF AFRICN AM mL/min/1.73 75 50*     Results from last 7 days   Lab Units 02/07/22  1134   ALBUMIN g/dL 3.20*   BILIRUBIN mg/dL 0.8   ALK PHOS U/L 61   AST (SGOT) U/L 17   ALT (SGPT) U/L 14     Results from last 7 days   Lab Units 02/08/22  0613 02/07/22  1134   CALCIUM mg/dL 8.8 9.2   ALBUMIN g/dL  --  3.20*     Results from last 7 days   Lab Units 02/07/22  1134   PROCALCITONIN ng/mL 0.23   LACTATE mmol/L 1.1     COVID19   Date Value Ref Range Status   02/07/2022 Detected (C) Not Detected - Ref. Range Final     No results found for: HGBA1C, POCGLU    CT Angiogram Chest  Narrative: CT ANGIOGRAM OF THE CHEST. MULTIPLE CORONAL, SAGITTAL, AND 3D  RECONSTRUCTIONS     HISTORY: 72-year-old male with increasing dyspnea. Pulmonary  thromboemboli history in the past.     TECHNIQUE: Radiation dose reduction techniques  were utilized, including  automated exposure control and exposure modulation based on body size.   CT angiogram of the chest was performed following the administration of  IV contrast. Multiple coronal, sagittal, and 3D reconstruction images  were obtained. There is no previous CT for comparison.     FINDINGS: There is adequate opacification of the pulmonary arteries and  there are multiple small pulmonary thromboemboli. The largest pulmonary  thromboembolus is within the lobar right upper lobe pulmonary artery  extending into segmental branches. This thromboembolus is best seen on  the coronal reconstruction series. Segmental lingular pulmonary artery  and branches. There are segmental right and lower lobe pulmonary  thromboemboli and subsegmental pulmonary thromboemboli at the left lower  lobe. The RV:LV ratio is less than 1 (2.3 cm/2.5 cm). There are mixed  density airspace consolidations at the right lower lobe and to a lesser  degree the left lower lobe. There are also predominantly peripheral  airspace opacities at the right upper lobe and faint ground glass  density within the lingula. There is a small right pleural effusion and  a tiny left pleural effusion. There is a small pericardial effusion as  well. There is no pathologic-appearing lymphadenopathy within the chest.  The esophagus appears patulous. There is an approximately 3 cm  subcutaneous low-attenuation lesion at the midline of the mid chest and  there is also significant thickening of the skin throughout much of the  anterior upper and mid chest.     Impression: 1. There are bilateral pulmonary thromboemboli. The largest is within  the lobar right upper lobe pulmonary artery. RV:LV ratio is less than 1.  2. The right lower lobe airspace consolidations have an appearance most  suspicious for sequela of pulmonary thromboemboli. The ground glass  opacities present bilaterally are suspected to represent COVID  pneumonia.  3. Small pericardial  effusion. Small right pleural effusion and tiny  left pleural effusion. Patulous appearance of the esophagus. Query  history of achalasia and esophageal dysmotility.  4. Significant thickening of the skin at the anterior aspect of the  upper and mid chest and an approximately 3 cm subcutaneous lesion at the  mid line subcutaneous tissues of the mid chest. The rounded lesion may  represent a complex sebaceous cyst, but correlate the skin thickening  clinically.     Discussed with Dr. Pisano.     This report was finalized on 2/8/2022 6:48 AM by Dr. Kelley Block M.D.       Scheduled Medications  [START ON 2/9/2022] pantoprazole, 40 mg, Oral, Q AM  senna, 2 tablet, Oral, BID    Infusions  heparin, 18 Units/kg/hr, Last Rate: 12 Units/kg/hr (02/08/22 0948)    Diet  Diet Regular; Cardiac       Assessment/Plan     Active Hospital Problems    Diagnosis  POA   • **Multiple subsegmental pulmonary emboli without acute cor pulmonale (HCC) [I26.94]  Yes   • Pneumonia due to COVID-19 virus [U07.1, J12.82]  Yes   • HTN (hypertension) [I10]  Yes   • GERD without esophagitis [K21.9]  Yes   • Acute deep vein thrombosis of calf, left (HCC) [I82.4Z2]  Yes      Resolved Hospital Problems   No resolved problems to display.     71yo gentleman who presented to ER with c/o 3 weeks of increasing ANN, and 2 weeks of productive cough and subjective fever. Found to have COViD pneumonia as well as bilateral PEs.    Bilateral PEs and left CVT: has h/o bilateral PEs in late 2020 and completed course of anticoagulation (Eliquis) in January of 2021. Suspect this VTE is related to COViD but given that this is second episode will ask Heme/Onc to see here. Continue Hep gtt. Ordered venous duplex this AM and it revealed left CVT. No evidence of right heart strain on CT.    COViD pneumonia: no hypoxia at present therefore will not treat. If develops hypoxia would start steroids. Has been symptomatic too long to consider Remdesivir. Will monitor inflammatory  markers here. Continue enhanced isolation. Given his PEs, COViD, and report of hemoptysis--will ask Pulm to see. His PCT is 0.23, will repeat in AM. No abx at this time.    HTN: takes amlodipine at home--on hold at present. BPs fine so far here. Will continue to monitor.     GERD: continue PPI.      Heparin gtt should suffice for DVT prophylaxis.  Full code.  Discussed with patient, nursing staff, CCP and care team on multidisciplinary rounds.  Anticipate discharge home with family, timing yet to be determined.    During all interaction with pt proper PPE was utilized (gown, gloves, mask, goggles, and face shield) and was donned/doffed according to recommendations. Hands were cleaned before and after interaction.    Faisal Todd MD  [facility]  02/08/22  14:24 EST

## 2022-02-08 NOTE — H&P
HISTORY AND PHYSICAL   [facility]        Date of Admission: 2022  Patient Identification:  Name: David Nugent  Age: 72 y.o.  Sex: male  :  1950  MRN: [id number]                     Primary Care Physician: Provider, No Known    Chief Complaint:  72 year old gentleman who presented to the emergency room with shortness of breath worse with exertion for the last few weeks; no chest pain; he has also had a cough; he denies fever or chills; he has a history of pulmonary embolus but was taken off anticoagulation after a few months; he denies sick contacts; has not had a covid vaccine    History of Present Illness:   As above    Past Medical History:  No past medical history on file.  Past Surgical History:  No past surgical history on file.   Home Meds:  (Not in a hospital admission)      Allergies:  No Known Allergies  Immunizations:    There is no immunization history on file for this patient.  Social History:   Social History     Social History Narrative   • Not on file     Social History     Socioeconomic History   • Marital status:        Family History:  No family history on file.     Review of Systems  See history of present illness and past medical history.  Patient denies headache, dizziness, syncope, falls, trauma, change in vision, change in hearing, change in taste, changes in weight, changes in appetite, focal weakness, numbness, or paresthesia.  Patient denies chest pain, palpitations, sinus pressure, rhinorrhea, epistaxis, hemoptysis, nausea, vomiting,hematemesis, diarrhea, constipation or hematchezia.  Denies cold or heat intolerance, polydipsia, polyuria, polyphagia. Denies hematuria, pyuria, dysuria, hesitancy, frequency or urgency. Denies consumption of raw and under cooked meats foods or change in water source.  Denies fever, chills, sweats, night sweats.  Denies missing any routine medications. Remainder of ROS is negative.    Objective:  T Max 24 hrs: Temp (24hrs),  "Av.2 °F (36.8 °C), Min:97.7 °F (36.5 °C), Max:98.7 °F (37.1 °C)    Vitals Ranges:   Temp:  [97.7 °F (36.5 °C)-98.7 °F (37.1 °C)] 98.7 °F (37.1 °C)  Heart Rate:  [71-92] 75  Resp:  [16-18] 16  BP: (126-143)/(72-86) 129/77      Exam:  /77 (BP Location: Right arm, Patient Position: Lying)   Pulse 75   Temp 98.7 °F (37.1 °C) (Oral)   Resp 16   Ht 175.3 cm (69\")   Wt 81.6 kg (180 lb)   SpO2 97%   BMI 26.58 kg/m²     General Appearance:    Alert, cooperative, no distress, appears stated age   Head:    Normocephalic, without obvious abnormality, atraumatic   Eyes:    PERRL, conjunctivae/corneas clear, EOM's intact, both eyes   Ears:    Normal external ear canals, both ears   Nose:   Nares normal, septum midline, mucosa normal, no drainage    or sinus tenderness   Throat:   Lips, mucosa, and tongue normal   Neck:   Supple, symmetrical, trachea midline, no adenopathy;     thyroid:  no enlargement/tenderness/nodules; no carotid    bruit or JVD   Back:     Symmetric, no curvature, ROM normal, no CVA tenderness   Lungs:     Decreased breath sounds bilaterally, respirations unlabored   Chest Wall:    No tenderness or deformity    Heart:    Regular rate and rhythm, S1 and S2 normal, no murmur, rub   or gallop   Abdomen:     Soft, nontender, bowel sounds active all four quadrants,     no masses, no hepatomegaly, no splenomegaly   Extremities:   Extremities normal, atraumatic, no cyanosis or edema   Pulses:   2+ and symmetric all extremities   Skin:   Skin color, texture, turgor normal, no rashes or lesions   Lymph nodes:   Cervical, supraclavicular, and axillary nodes normal   Neurologic:   CNII-XII intact, normal strength, sensation intact throughout      .    Data Review:  Labs in chart were reviewed.  WBC   Date Value Ref Range Status   2022 2.88 (L) 3.40 - 10.80 10*3/mm3 Final     Hemoglobin   Date Value Ref Range Status   2022 13.3 13.0 - 17.7 g/dL Final     Hematocrit   Date Value Ref Range " Status   02/07/2022 40.0 37.5 - 51.0 % Final     Platelets   Date Value Ref Range Status   02/07/2022 165 140 - 450 10*3/mm3 Final     Sodium   Date Value Ref Range Status   02/07/2022 136 136 - 145 mmol/L Final     Potassium   Date Value Ref Range Status   02/07/2022 4.3 3.5 - 5.2 mmol/L Final     Chloride   Date Value Ref Range Status   02/07/2022 101 98 - 107 mmol/L Final     CO2   Date Value Ref Range Status   02/07/2022 27.0 22.0 - 29.0 mmol/L Final     BUN   Date Value Ref Range Status   02/07/2022 14 8 - 23 mg/dL Final     Creatinine   Date Value Ref Range Status   02/07/2022 1.63 (H) 0.76 - 1.27 mg/dL Final     Glucose   Date Value Ref Range Status   02/07/2022 95 65 - 99 mg/dL Final     Calcium   Date Value Ref Range Status   02/07/2022 9.2 8.6 - 10.5 mg/dL Final     AST (SGOT)   Date Value Ref Range Status   02/07/2022 17 1 - 40 U/L Final     ALT (SGPT)   Date Value Ref Range Status   02/07/2022 14 1 - 41 U/L Final     Alkaline Phosphatase   Date Value Ref Range Status   02/07/2022 61 39 - 117 U/L Final     INR   Date Value Ref Range Status   02/07/2022 1.00 0.90 - 1.10 Final                Imaging Results (All)     Procedure Component Value Units Date/Time    CT Angiogram Chest [[id number]] Collected: 02/07/22 1501     Updated: 02/07/22 1501    Narrative:      CT ANGIOGRAM OF THE CHEST. MULTIPLE CORONAL, SAGITTAL, AND 3D  RECONSTRUCTIONS     HISTORY: 72-year-old male with increasing dyspnea. Pulmonary  thromboemboli history in the past.     TECHNIQUE: Radiation dose reduction techniques were utilized, including  automated exposure control and exposure modulation based on body size.   CT angiogram of the chest was performed following the administration of  IV contrast. Multiple coronal, sagittal, and 3D reconstruction images  were obtained. There is no previous CT for comparison.     FINDINGS: There is adequate opacification of the pulmonary arteries and  there are multiple small pulmonary thromboemboli.  The largest pulmonary  thromboembolus is within the lobar right upper lobe pulmonary artery  extending into segmental branches. This thromboembolus is best seen on  the coronal reconstruction series. Segmental lingular pulmonary artery  and branches. There are segmental right and lower lobe pulmonary  thromboemboli and subsegmental pulmonary thromboemboli at the left lower  lobe. The RV:LV ratio is less than 1 (2.3 cm/2.5 cm). There are mixed  density airspace consolidations at the right lower lobe and to a lesser  degree the left lower lobe. There are also predominantly peripheral  airspace opacities at the right upper lobe and faint ground glass  density within the lingula. There is a small right pleural effusion and  a tiny left pleural effusion. There is a small pericardial effusion as  well. There is no pathologic-appearing lymphadenopathy within the chest.  The esophagus appears patulous. There is an approximately 3 cm  subcutaneous low-attenuation lesion at the midline of the mid chest and  there is also significant thickening of the skin throughout much of the  anterior upper and mid chest.       Impression:      1. There are bilateral pulmonary thromboemboli. The largest is within  the lobar right upper lobe pulmonary artery. RV:LV ratio is less than 1.  2. The right lower lobe airspace consolidations have an appearance most  suspicious for sequela of pulmonary thromboemboli. The ground glass  opacities present bilaterally are suspected to represent COVID  pneumonia.  3. Small pericardial effusion. Small right pleural effusion and tiny  left pleural effusion. Patulous appearance of the esophagus. Query  history of achalasia and esophageal dysmotility.  4. Significant thickening of the skin at the anterior aspect of the  upper and mid chest and an approximately 3 cm subcutaneous lesion at the  midline subcutaneous tissues of the mid chest. The rounded lesion may  represent a complex sebaceous cyst, but  correlate the skin thickening  clinically.     Discussed with Dr. Pisano.             XR Chest AP [[id number]] Collected: 02/07/22 1158     Updated: 02/07/22 1203    Narrative:      XR CHEST AP-     INDICATIONS: Cough and fever, Covid evaluation     TECHNIQUE: Frontal view of the chest     COMPARISON: None available     FINDINGS:     The heart size is borderline. Pulmonary vasculature is unremarkable.  Aorta is tortuous. Minimal patchy density at the right midlung could be  an area of developing pneumonia, follow-up suggested. No pleural  effusion, or pneumothorax. No acute osseous process.       Impression:         Minimal patchy density at the right midlung could be an area of  developing pneumonia, follow-up suggested. Tortuous aorta.     This report was finalized on 2/7/2022 12:00 PM by Dr. Simba Olivares M.D.           Patient Active Problem List   Diagnosis Code   • Multiple subsegmental pulmonary emboli without acute cor pulmonale (HCC) I26.94       Assessment:  Active Hospital Problems    Diagnosis  POA   • Multiple subsegmental pulmonary emboli without acute cor pulmonale (HCC) [I26.94]  Yes      Resolved Hospital Problems   No resolved problems to display.   covid  ckd3      Plan:  Will continue heparin  He is stable on room air so will not need steroids  Check echo  Monitor hgb  D.w patient and ED provider    Porsha Noyola MD  2/7/2022  20:10 EST

## 2022-02-08 NOTE — ED NOTES
Pt noted to have mask on when this RN entered the room.  This RN wore appropriate PPE throughout our encounter. Hand hygiene performed upon entering and exiting room.       Nan Rankin, RN  02/07/22 1982

## 2022-02-08 NOTE — ED NOTES
Nursing report ED to floor  David Nugent  72 y.o.  male    HPI :   Chief Complaint   Patient presents with   • Shortness of Breath   • Fatigue       Admitting doctor:   Porsha Noyola MD    Admitting diagnosis:   The primary encounter diagnosis was Multiple subsegmental pulmonary emboli without acute cor pulmonale (HCC). A diagnosis of Pneumonia due to COVID-19 virus was also pertinent to this visit.    Code status:   Current Code Status     Date Active Code Status Order ID Comments User Context       2/7/2022 1737 CPR (Attempt to Resuscitate) [id number]  Porsha Noyola MD ED     Advance Care Planning Activity      Questions for Current Code Status     Question Answer    Code Status (Patient has no pulse and is not breathing) CPR (Attempt to Resuscitate)    Medical Interventions (Patient has pulse or is breathing) Full          Allergies:   Patient has no known allergies.    Intake and Output  No intake or output data in the 24 hours ending 02/07/22 2006    Weight:       02/07/22  1121   Weight: 81.6 kg (180 lb)       Most recent vitals:   Vitals:    02/07/22 1731 02/07/22 1801 02/07/22 1831 02/07/22 1931   BP: 126/82 136/80 141/82 129/77   BP Location:    Right arm   Patient Position:    Lying   Pulse: 87 72 71 75   Resp:    16   Temp:       TempSrc:       SpO2: 98% 95% 98% 97%   Weight:       Height:           Active LDAs/IV Access:   Lines, Drains & Airways     Active LDAs     Name Placement date Placement time Site Days    Peripheral IV 02/07/22 1139 Right Antecubital 02/07/22  1139  Antecubital  less than 1                Labs (abnormal labs have a star):   Labs Reviewed   RESPIRATORY PANEL PCR W/ COVID-19 (SARS-COV-2) RODGER/LYNNE/JASON/PAD/COR/MAD/DURAN IN-HOUSE, NP SWAB IN UTM/VTP, 3-4 HR TAT - Abnormal; Notable for the following components:       Result Value    COVID19 Detected (*)     All other components within normal limits    Narrative:     In the setting of a positive respiratory panel with a  viral infection PLUS a negative procalcitonin without other underlying concern for bacterial infection, consider observing off antibiotics or discontinuation of antibiotics and continue supportive care. If the respiratory panel is positive for atypical bacterial infection (Bordetella pertussis, Chlamydophila pneumoniae, or Mycoplasma pneumoniae), consider antibiotic de-escalation to target atypical bacterial infection.   COMPREHENSIVE METABOLIC PANEL - Abnormal; Notable for the following components:    Creatinine 1.63 (*)     Albumin 3.20 (*)     eGFR   Amer 50 (*)     All other components within normal limits    Narrative:     GFR Normal >60  Chronic Kidney Disease <60  Kidney Failure <15     D-DIMER, QUANTITATIVE - Abnormal; Notable for the following components:    D-Dimer, Quantitative 2.02 (*)     All other components within normal limits    Narrative:     The Stago D-Dimer test used in conjunction with a clinical pretest probability (PTP) assessment model, has been approved by the FDA to rule out the presence of venous thromboembolism (VTE) in outpatients suspected of deep venous thrombosis (DVT) or pulmonary embolism (PE). The cut-off for negative predictive value is <0.50 MCGFEU/mL.   CBC WITH AUTO DIFFERENTIAL - Abnormal; Notable for the following components:    WBC 2.88 (*)     Lymphocyte % 19.4 (*)     Monocyte % 13.9 (*)     Lymphocytes, Absolute 0.56 (*)     All other components within normal limits   PROCALCITONIN - Normal    Narrative:     As a Marker for Sepsis (Non-Neonates):     1. <0.5 ng/mL represents a low risk of severe sepsis and/or septic shock.  2. >2 ng/mL represents a high risk of severe sepsis and/or septic shock.    As a Marker for Lower Respiratory Tract Infections that require antibiotic therapy:  PCT on Admission     Antibiotic Therapy             6-12 Hrs later  >0.5                          Strongly Recommended            >0.25 - <0.5             Recommended  0.1 - 0.25         "          Discouraged                       Remeasure/reassess PCT  <0.1                         Strongly Discouraged         Remeasure/reassess PCT      As 28 day mortality risk marker: \"Change in Procalcitonin Result\" (>80% or <=80%) if Day 0 (or Day 1) and Day 4 values are available. Refer to http://www.[employer]-pct-calculator.com/    Change in PCT <=80 %   A decrease of PCT levels below or equal to 80% defines a positive change in PCT test result representing a higher risk for 28-day all-cause mortality of patients diagnosed with severe sepsis or septic shock.    Change in PCT >80 %   A decrease of PCT levels of more than 80% defines a negative change in PCT result representing a lower risk for 28-day all-cause mortality of patients diagnosed with severe sepsis or septic shock.               LACTIC ACID, PLASMA - Normal   TROPONIN (IN-HOUSE) - Normal    Narrative:     Troponin T Reference Range:  <= 0.03 ng/mL-   Negative for AMI  >0.03 ng/mL-     Abnormal for myocardial necrosis.  Clinicians would have to utilize clinical acumen, EKG, Troponin and serial changes to determine if it is an Acute Myocardial Infarction or myocardial injury due to an underlying chronic condition.       Results may be falsely decreased if patient taking Biotin.     BNP (IN-HOUSE) - Normal    Narrative:     Among patients with dyspnea, NT-proBNP is highly sensitive for the detection of acute congestive heart failure. In addition NT-proBNP of <300 pg/ml effectively rules out acute congestive heart failure with 99% negative predictive value.    Results may be falsely decreased if patient taking Biotin.     PROTIME-INR - Normal   APTT - Normal   BLOOD CULTURE   BLOOD CULTURE   CBC AND DIFFERENTIAL    Narrative:     The following orders were created for panel order CBC & Differential.  Procedure                               Abnormality         Status                     ---------                               -----------         ------      "                CBC Auto Differential[[id number]]        Abnormal            Final result                 Please view results for these tests on the individual orders.       EKG:   ECG 12 Lead   Final Result   HEART RATE= 77  bpm   RR Interval= 828  ms   CO Interval= 237  ms   P Horizontal Axis= -7  deg   P Front Axis= 18  deg   QRSD Interval= 83  ms   QT Interval= 368  ms   QRS Axis= -12  deg   T Wave Axis= -12  deg   - ABNORMAL ECG -   Sinus rhythm   Multiple ventricular premature complexes   Prolonged CO interval   Borderline T abnormalities, inferior leads   NO PRIOR TRACING AVAILABLE FOR COMPARISON   Electronically Signed By: Brian Enriquez ([facility] 07-Feb-2022 16:46:51   Date and Time of Study: 2022-02-07 12:56:13          Meds given in ED:   Medications   sodium chloride 0.9 % flush 10 mL (has no administration in time range)   heparin 96298 units/250 mL (100 units/mL) in 0.45 % NaCl infusion (18 Units/kg/hr × 81.6 kg Intravenous New Bag 2/7/22 1500)   heparin (porcine) 5000 UNIT/ML injection 3,300-6,500 Units (has no administration in time range)   nitroglycerin (NITROSTAT) SL tablet 0.4 mg (has no administration in time range)   acetaminophen (TYLENOL) tablet 650 mg (has no administration in time range)   ondansetron (ZOFRAN) tablet 4 mg (has no administration in time range)     Or   ondansetron (ZOFRAN) injection 4 mg (has no administration in time range)   melatonin tablet 3 mg (has no administration in time range)   iopamidol (ISOVUE-370) 76 % injection 95 mL (95 mL Intravenous Given by Other 2/7/22 1350)   heparin (porcine) 5000 UNIT/ML injection 6,500 Units (6,500 Units Intravenous Given 2/7/22 1458)       Imaging results:  CT Angiogram Chest    Result Date: 2/7/2022  1. There are bilateral pulmonary thromboemboli. The largest is within the lobar right upper lobe pulmonary artery. RV:LV ratio is less than 1. 2. The right lower lobe airspace consolidations have an appearance most suspicious for sequela of  pulmonary thromboemboli. The ground glass opacities present bilaterally are suspected to represent COVID pneumonia. 3. Small pericardial effusion. Small right pleural effusion and tiny left pleural effusion. Patulous appearance of the esophagus. Query history of achalasia and esophageal dysmotility. 4. Significant thickening of the skin at the anterior aspect of the upper and mid chest and an approximately 3 cm subcutaneous lesion at the midline subcutaneous tissues of the mid chest. The rounded lesion may represent a complex sebaceous cyst, but correlate the skin thickening clinically.  Discussed with Dr. Pisano.        XR Chest AP    Result Date: 2/7/2022   Minimal patchy density at the right midlung could be an area of developing pneumonia, follow-up suggested. Tortuous aorta.  This report was finalized on 2/7/2022 12:00 PM by Dr. Simba Olivares M.D.        Ambulatory status:   - Up ad seven    Social issues:   Social History     Socioeconomic History   • Marital status:        NIH Stroke Scale:        Nursing report ED to floor:       Yamila Gates RN  02/07/22 2007

## 2022-02-08 NOTE — CONSULTS
Referring Provider: Dr. Todd  Reason for Consultation: COVID-19 and PE    Patient Care Team:  Provider, No Known as PCP - General    Chief complaint:   Shortness of breath    History of present illness:    Subjective   This is a 72-year-old male patient, non-smoker with no prior history of lung disease but reported history of PE, apparently unprovoked, occurred last year and for which she was hospitalized at [place] after an episode of syncope and treated with anticoagulation including Eliquis for a total of 6 months.    He presented to the hospital on 2/7 for shortness of breath on activities started about 3-4 weeks ago.  Associated symptoms include cough productive of mild yellowish phlegm.  No fever or chills.  He had no chest pain but some mild pain in his throat following the episodes of cough.  He did test positive for COVID-19 and also had CTA consistent with bilateral subsegmental pulmonary emboli.  He is currently being treated with heparin.  No recent travel, surgery or trauma.  He stated that he is usually not so much active but denies prolonged immobilization.    Patient is on RA.  SPO2 is 97% at the present time but was noted to have ritu SPO2 of 90%.    Labs reviewed: CRP 5; WBC 2; hemoglobin 12;    Review of Systems  Constitutional: No fever or chills.   ENMT: No sinus congestion  Cardiovascular: No chest pain, palpitation or legs swelling.    Respiratory: See above  Gastrointestinal: No constipation, diarrhea or abdominal pain   Neurology: No headache, weakness, numbness or dizziness.   Musculoskeletal: No joints pain, stiffness or swelling.  He reported appearance of nodules underneath his skin in his legs and wrists and the back of his neck.  Psychiatry: No depression.  Genitourinary: No dysuria or frequent urination  Endo: No weight changes. No cold or warm intolerance.  Lymphatic: No swollen glands.  Integumentary: No rash.    History  Past Medical History:   Diagnosis Date   •  Constipation    • GERD (gastroesophageal reflux disease)    • History of pulmonary embolus (PE)    • Hypertension    , History reviewed. No pertinent surgical history., History reviewed. No pertinent family history.,   Social History     Socioeconomic History   • Marital status:    Tobacco Use   • Smoking status: Never Smoker   • Smokeless tobacco: Never Used   Vaping Use   • Vaping Use: Never used   Substance and Sexual Activity   • Alcohol use: Never   • Drug use: Never   • Sexual activity: Defer     E-cigarette/Vaping   • E-cigarette/Vaping Use Never User      E-cigarette/Vaping Substances   • Nicotine No    • THC No    • CBD No    • Flavoring No      E-cigarette/Vaping Devices       ,   Medications Prior to Admission   Medication Sig Dispense Refill Last Dose   • amLODIPine (NORVASC) 10 MG tablet Take 10 mg by mouth Daily.   2/7/2022 at Unknown time   • pantoprazole (PROTONIX) 40 MG EC tablet Take 40 mg by mouth 2 (Two) Times a Day Before Meals.   2/7/2022 at Unknown time   • vitamin D (ERGOCALCIFEROL) 1.25 MG (77014 UT) capsule capsule Take 50,000 Units by mouth 1 (One) Time Per Week. PT TAKES IT EVERY FRIDAY   Past Week at Unknown time   , Scheduled Meds:  [START ON 2/9/2022] pantoprazole, 40 mg, Oral, Q AM  senna, 2 tablet, Oral, BID    , Continuous Infusions:  heparin, 18 Units/kg/hr, Last Rate: 12 Units/kg/hr (02/08/22 1211)     and Allergies:  Patient has no known allergies.    Objective     Vital Signs   Temp:  [97.4 °F (36.3 °C)-98.5 °F (36.9 °C)] 97.4 °F (36.3 °C)  Heart Rate:  [67-92] 71  Resp:  [16] 16  BP: (126-143)/(58-86) 127/58    PPE used per hospital policy    Physical Exam:  Constitutional: Not in acute distress.  Eyes: Injected conjunctivae, EOMI. pupils equal reactive to light.  ENMT: Lehman 3. No oral thrush. Tonsils grade  Neck:Trachea midline. No thyromegaly  Heart: RRR, no murmur  Lungs: Good and equal air entry bilaterally.  Relatively clear with only mild crackles at the bases.   No wheezing.  No use of accessory muscles  Abdomen:  Soft. No tenderness or dullness. No HSM.  Extremities: No cyanosis, clubbing or pitting edema.  Warm extremities and well-perfused.  Neuro: Conscious, alert, oriented x3.  Strength 5/5 in arms.  Psych: Appropriate mood and affect.    Integumentary: No rash.  Normal skin turgor.  Palpable subcutaneous nodules on the wrists bilaterally, dorsal aspect.  Also a palpable nodule in the occipital area.  Soft with no underlying skin change.  Lymphatic: No palpable cervical or supraclavicular lymph nodes.      Diagnostic imaging:  I personally and independently reviewed the following images:   CT chest 2/7/2022: Bilateral subsegmental pulmonary embolism.  Bilateral lower lobe groundglass infiltrates.  Bilateral pleural effusion, mild.        Assessment   1. Acute bilateral PE: Can be provoked by COVID infection  2. Acute LLE SVT and DVT  3. Bilateral COVID-19 pneumonia.  4. Bilateral pleural effusion, mild.  5. Low oxygen saturation  6. Subcutaneous nodules        Recommendations:    Start dexamethasone 6 mg daily for 10 days or less.  Although he is r not much symptomatic and he is on room air but his lowest saturation is 90% (<94%) and he has bilateral pulmonary infiltrates and elevated CRP which would qualify him for treatment with steroid for COVID.      Heparin for PE and DVT.  Can change to Eliquis on discharge.  Noted heme on consulted and therefore will hold off on switching him for now in case studies are being ordered.  Patient has very mild hemoptysis which is either related to the PE or the anticoagulation or both but would not be much concerned about it at this point.    Concerning the subcutaneous nodules located on his wrists and his neck, I am not aware of an association with pulmonary embolism or VTE.  I am not quite sure about  the etiology of these nodules    Discussed with Dr. Perez Hernandez MD  02/08/22  15:41 EST

## 2022-02-08 NOTE — CONSULTS
Subjective     REASON FOR CONSULTATION:    Evaluation & management for recurrent PEs                             REQUESTING PHYSICIAN:  MD Charles    RECORDS OBTAINED:  Records of the patients history including those obtained from the referring provider were reviewed and summarized in detail.    HISTORY OF PRESENT ILLNESS:  The patient is a 72 y.o. year old male with medical issues comprising GERD, HTN & pulmonary embolism had presented to the [facility] ER on 2/7/22 with SOB and cough. He was tested positive for COVID19 infection. A CTA chest revealed bilateral pulmonary thromboemboli. The largest is within  the lobar right upper lobe pulmonary artery. RV:LV ratio is less than 1.  2. The right lower lobe airspace consolidations have an appearance most  suspicious for sequela of pulmonary thromboemboli. The ground glass  opacities present bilaterally are suspected to represent COVID  Pneumonia.   He has not been vaccinated for covid.   Patient has been started on heparin drip. Hematology consulted for further evaluation & management.     Patient reports of being diagnosed with bilateral PE in September 2020 at the [facility]. No DVTs. Says he took eliquis for few months and then stopped. Per record review, a CTA chest from 9/17/20 had noted bilateral pulmonary emboli involving the segmental pulmonary arteries of all lobes of both lungs. Radiographic findings suspicious for right heart strain. Suspected focal pulmonary infarct in the right lower lobe.      He denies any prior provoking events. Says he just passed out & could not breath.  No other history of thrombus. No family history of VTEs.     Past Medical History:   Diagnosis Date   • Constipation    • GERD (gastroesophageal reflux disease)    • History of pulmonary embolus (PE)    • Hypertension         History reviewed. No pertinent surgical history.     No current facility-administered medications on file prior to encounter.     Current Outpatient  Medications on File Prior to Encounter   Medication Sig Dispense Refill   • amLODIPine (NORVASC) 10 MG tablet Take 10 mg by mouth Daily.     • pantoprazole (PROTONIX) 40 MG EC tablet Take 40 mg by mouth 2 (Two) Times a Day Before Meals.     • vitamin D (ERGOCALCIFEROL) 1.25 MG (13686 UT) capsule capsule Take 50,000 Units by mouth 1 (One) Time Per Week. PT TAKES IT EVERY FRIDAY          ALLERGIES:  No Known Allergies     Social History     Socioeconomic History   • Marital status:    Tobacco Use   • Smoking status: Never Smoker   • Smokeless tobacco: Never Used   Vaping Use   • Vaping Use: Never used   Substance and Sexual Activity   • Alcohol use: Never   • Drug use: Never   • Sexual activity: Defer        History reviewed. No pertinent family history.     Review of Systems   GENERAL: No change in appetite or weight;   No fevers, chills, sweats.    SKIN: No nonhealing lesions.   No rashes.  HEME/LYMPH: No easy bruising, bleeding.   No swollen nodes.   EYES: No vision changes or diplopia.   ENT: No tinnitus, hearing loss, gum bleeding, epistaxis, hoarseness or dysphagia.   RESPIRATORY: No cough, has shortness of breath, hemoptysis   CVS: No chest pain, palpitations, orthopnea, dyspnea on exertion or PND.   GI: No melena or hematochezia.   No abdominal pain.  No nausea, vomiting, constipation, diarrhea  : No lower tract obstructive symptoms, dysuria or hematuria.   MUSCULOSKELETAL: No bone pain.  No joint stiffness.   NEUROLOGICAL: No global weakness, loss of consciousness or seizures.   PSYCHIATRIC: No increased nervousness, mood changes or depression.     Objective     Vitals:    02/07/22 2132 02/07/22 2347 02/08/22 0629 02/08/22 0700   BP: 138/78 134/81  134/86   BP Location: Left arm Right arm  Right arm   Patient Position: Lying Lying  Lying   Pulse: 75 76  67   Resp: 16 16  16   Temp: 98.5 °F (36.9 °C) 98.4 °F (36.9 °C)  97.5 °F (36.4 °C)   TempSrc: Oral Oral  Oral   SpO2: 95% 97%  95%   Weight: 89.2 kg  "(196 lb 9.6 oz)  88.8 kg (195 lb 12.8 oz)    Height: 175.3 cm (69\")        No flowsheet data found.    Physical Exam    CONSTITUTIONAL:  Vital signs reviewed.  No distress, looks comfortable.  EYES:  Conjunctiva and lids unremarkable.    EARS,NOSE,MOUTH,THROAT:  Ears and nose appear unremarkable.  Lips, teeth, gums appear unremarkable.  RESPIRATORY:  Normal respiratory effort.  Lungs clear to auscultation bilaterally.  CARDIOVASCULAR:  Normal S1, S2.  No murmurs rubs or gallops.  No significant lower extremity edema.  GASTROINTESTINAL: Abdomen appears unremarkable.  Nondistended  LYMPHATIC:  No cervical, supraclavicular lymphadenopathy.  SKIN:  Warm.  No rashes.  PSYCHIATRIC:  Normal judgment and insight.  Normal mood and affect.  NEURO: AAO x 3, no focal deficits    RECENT LABS:  Hematology WBC   Date Value Ref Range Status   02/08/2022 2.85 (L) 3.40 - 10.80 10*3/mm3 Final     RBC   Date Value Ref Range Status   02/08/2022 4.54 4.14 - 5.80 10*6/mm3 Final     Hemoglobin   Date Value Ref Range Status   02/08/2022 12.7 (L) 13.0 - 17.7 g/dL Final     Hematocrit   Date Value Ref Range Status   02/08/2022 38.0 37.5 - 51.0 % Final     Platelets   Date Value Ref Range Status   02/08/2022 171 140 - 450 10*3/mm3 Final          Assessment/Plan    is a pleasant 71 y/o AAM with medical issues comprising GERD, HTN & pulmonary embolism had presented to the [facility] ER on 2/7/22 with SOB and cough. He was tested positive for COVID19 infection. A CTA chest revealed bilateral pulmonary thromboemboli. Hematology has been consulted for further evaluation & management.     # Bilateral pulmonary embolism:  - Recurrent. First diagnosed in September 2020 when he had developed bilateral PE with RV strain. Denies any obvious provoking event prior to that. Says he took eliquis x 3-4 months & then stopped on his own. Did not see a hematologist. No other episodes of VTEs or any family history of thrombosis.   - Current episode of bilateral " PEs seems provoked by COVID19 infection.  - Given recurrent PEs & prior unprovoked event, will perform hypercoagulable work up as well. Regardless, he will need long term anticoagulation.   -  Is doing well on heparin drip. He can be transitioned to a DOAC. He did well on eliquis before.   - Patient denies smoking. Recommended to discuss age-appropriate cancer screening with his PCP.    Will sign off. Please call us with any questions.   Will plan to follow up in hematology clinic.

## 2022-02-08 NOTE — PLAN OF CARE
Goal Outcome Evaluation:              Outcome Summary: Patient alert and oriented X4. VSS, on room air. Up ad seven to bathroom. Pt receiving ordered heparin drip. Denies pain.

## 2022-02-09 ENCOUNTER — APPOINTMENT (OUTPATIENT)
Dept: ULTRASOUND IMAGING | Facility: HOSPITAL | Age: 72
End: 2022-02-09

## 2022-02-09 ENCOUNTER — APPOINTMENT (OUTPATIENT)
Dept: GENERAL RADIOLOGY | Facility: HOSPITAL | Age: 72
End: 2022-02-09

## 2022-02-09 PROBLEM — R11.2 NAUSEA & VOMITING: Status: ACTIVE | Noted: 2022-02-09

## 2022-02-09 PROBLEM — K59.09 CHRONIC CONSTIPATION: Chronic | Status: ACTIVE | Noted: 2022-02-09

## 2022-02-09 LAB
ALBUMIN SERPL-MCNC: 3 G/DL (ref 3.5–5.2)
ALBUMIN/GLOB SERPL: 0.7 G/DL
ALP SERPL-CCNC: 63 U/L (ref 39–117)
ALT SERPL W P-5'-P-CCNC: 23 U/L (ref 1–41)
ANION GAP SERPL CALCULATED.3IONS-SCNC: 11.6 MMOL/L (ref 5–15)
APTT PPP: 89.7 SECONDS (ref 22.7–35.4)
AST SERPL-CCNC: 27 U/L (ref 1–40)
BASOPHILS # BLD AUTO: 0 10*3/MM3 (ref 0–0.2)
BASOPHILS NFR BLD AUTO: 0 % (ref 0–1.5)
BILIRUB SERPL-MCNC: 0.6 MG/DL (ref 0–1.2)
BUN SERPL-MCNC: 12 MG/DL (ref 8–23)
BUN/CREAT SERPL: 11.3 (ref 7–25)
CALCIUM SPEC-SCNC: 9 MG/DL (ref 8.6–10.5)
CHLORIDE SERPL-SCNC: 100 MMOL/L (ref 98–107)
CO2 SERPL-SCNC: 23.4 MMOL/L (ref 22–29)
CREAT SERPL-MCNC: 1.06 MG/DL (ref 0.76–1.27)
CRP SERPL-MCNC: 3.92 MG/DL (ref 0–0.5)
DEPRECATED RDW RBC AUTO: 40.7 FL (ref 37–54)
EOSINOPHIL # BLD AUTO: 0 10*3/MM3 (ref 0–0.4)
EOSINOPHIL NFR BLD AUTO: 0 % (ref 0.3–6.2)
ERYTHROCYTE [DISTWIDTH] IN BLOOD BY AUTOMATED COUNT: 13.5 % (ref 12.3–15.4)
F5 GENE MUT ANL BLD/T: NORMAL
FERRITIN SERPL-MCNC: 1024 NG/ML (ref 30–400)
GFR SERPL CREATININE-BSD FRML MDRD: 83 ML/MIN/1.73
GLOBULIN UR ELPH-MCNC: 4.5 GM/DL
GLUCOSE SERPL-MCNC: 124 MG/DL (ref 65–99)
HCT VFR BLD AUTO: 40.1 % (ref 37.5–51)
HGB BLD-MCNC: 13.7 G/DL (ref 13–17.7)
IMM GRANULOCYTES # BLD AUTO: 0.01 10*3/MM3 (ref 0–0.05)
IMM GRANULOCYTES NFR BLD AUTO: 0.5 % (ref 0–0.5)
LDH SERPL-CCNC: 171 U/L (ref 135–225)
LYMPHOCYTES # BLD AUTO: 0.43 10*3/MM3 (ref 0.7–3.1)
LYMPHOCYTES NFR BLD AUTO: 20.1 % (ref 19.6–45.3)
MAGNESIUM SERPL-MCNC: 1.9 MG/DL (ref 1.6–2.4)
MCH RBC QN AUTO: 28.3 PG (ref 26.6–33)
MCHC RBC AUTO-ENTMCNC: 34.2 G/DL (ref 31.5–35.7)
MCV RBC AUTO: 82.9 FL (ref 79–97)
MONOCYTES # BLD AUTO: 0.14 10*3/MM3 (ref 0.1–0.9)
MONOCYTES NFR BLD AUTO: 6.5 % (ref 5–12)
NEUTROPHILS NFR BLD AUTO: 1.56 10*3/MM3 (ref 1.7–7)
NEUTROPHILS NFR BLD AUTO: 72.9 % (ref 42.7–76)
NRBC BLD AUTO-RTO: 0 /100 WBC (ref 0–0.2)
PLATELET # BLD AUTO: 197 10*3/MM3 (ref 140–450)
PMV BLD AUTO: 10.6 FL (ref 6–12)
POTASSIUM SERPL-SCNC: 4.3 MMOL/L (ref 3.5–5.2)
PROCALCITONIN SERPL-MCNC: 0.12 NG/ML (ref 0–0.25)
PROT SERPL-MCNC: 7.5 G/DL (ref 6–8.5)
RBC # BLD AUTO: 4.84 10*6/MM3 (ref 4.14–5.8)
SODIUM SERPL-SCNC: 135 MMOL/L (ref 136–145)
WBC NRBC COR # BLD: 2.14 10*3/MM3 (ref 3.4–10.8)

## 2022-02-09 PROCEDURE — 85300 ANTITHROMBIN III ACTIVITY: CPT | Performed by: INTERNAL MEDICINE

## 2022-02-09 PROCEDURE — 85025 COMPLETE CBC W/AUTO DIFF WBC: CPT | Performed by: EMERGENCY MEDICINE

## 2022-02-09 PROCEDURE — 99233 SBSQ HOSP IP/OBS HIGH 50: CPT | Performed by: INTERNAL MEDICINE

## 2022-02-09 PROCEDURE — 85730 THROMBOPLASTIN TIME PARTIAL: CPT | Performed by: HOSPITALIST

## 2022-02-09 PROCEDURE — 83735 ASSAY OF MAGNESIUM: CPT | Performed by: HOSPITALIST

## 2022-02-09 PROCEDURE — 82728 ASSAY OF FERRITIN: CPT | Performed by: HOSPITALIST

## 2022-02-09 PROCEDURE — 86140 C-REACTIVE PROTEIN: CPT | Performed by: HOSPITALIST

## 2022-02-09 PROCEDURE — 81241 F5 GENE: CPT | Performed by: INTERNAL MEDICINE

## 2022-02-09 PROCEDURE — 76705 ECHO EXAM OF ABDOMEN: CPT

## 2022-02-09 PROCEDURE — 85705 THROMBOPLASTIN INHIBITION: CPT | Performed by: INTERNAL MEDICINE

## 2022-02-09 PROCEDURE — 85670 THROMBIN TIME PLASMA: CPT | Performed by: INTERNAL MEDICINE

## 2022-02-09 PROCEDURE — 86146 BETA-2 GLYCOPROTEIN ANTIBODY: CPT | Performed by: INTERNAL MEDICINE

## 2022-02-09 PROCEDURE — 97161 PT EVAL LOW COMPLEX 20 MIN: CPT

## 2022-02-09 PROCEDURE — 85732 THROMBOPLASTIN TIME PARTIAL: CPT | Performed by: INTERNAL MEDICINE

## 2022-02-09 PROCEDURE — 80053 COMPREHEN METABOLIC PANEL: CPT | Performed by: HOSPITALIST

## 2022-02-09 PROCEDURE — 84145 PROCALCITONIN (PCT): CPT | Performed by: HOSPITALIST

## 2022-02-09 PROCEDURE — 86147 CARDIOLIPIN ANTIBODY EA IG: CPT | Performed by: INTERNAL MEDICINE

## 2022-02-09 PROCEDURE — 97116 GAIT TRAINING THERAPY: CPT

## 2022-02-09 PROCEDURE — 85613 RUSSELL VIPER VENOM DILUTED: CPT | Performed by: INTERNAL MEDICINE

## 2022-02-09 PROCEDURE — 74018 RADEX ABDOMEN 1 VIEW: CPT

## 2022-02-09 PROCEDURE — 85210 CLOT FACTOR II PROTHROM SPEC: CPT | Performed by: INTERNAL MEDICINE

## 2022-02-09 PROCEDURE — 83615 LACTATE (LD) (LDH) ENZYME: CPT | Performed by: HOSPITALIST

## 2022-02-09 PROCEDURE — 25010000002 ONDANSETRON PER 1 MG: Performed by: INTERNAL MEDICINE

## 2022-02-09 RX ORDER — POLYETHYLENE GLYCOL 3350 17 G/17G
17 POWDER, FOR SOLUTION ORAL 2 TIMES DAILY
Status: DISCONTINUED | OUTPATIENT
Start: 2022-02-09 | End: 2022-02-10 | Stop reason: HOSPADM

## 2022-02-09 RX ORDER — AMLODIPINE BESYLATE 5 MG/1
5 TABLET ORAL
Status: DISCONTINUED | OUTPATIENT
Start: 2022-02-09 | End: 2022-02-10 | Stop reason: HOSPADM

## 2022-02-09 RX ORDER — HEPARIN SODIUM 5000 [USP'U]/ML
40-80 INJECTION, SOLUTION INTRAVENOUS; SUBCUTANEOUS EVERY 6 HOURS PRN
Status: DISCONTINUED | OUTPATIENT
Start: 2022-02-09 | End: 2022-02-09

## 2022-02-09 RX ADMIN — PANTOPRAZOLE SODIUM 40 MG: 40 TABLET, DELAYED RELEASE ORAL at 06:48

## 2022-02-09 RX ADMIN — ONDANSETRON 4 MG: 2 INJECTION INTRAMUSCULAR; INTRAVENOUS at 08:37

## 2022-02-09 RX ADMIN — APIXABAN 10 MG: 5 TABLET, FILM COATED ORAL at 20:52

## 2022-02-09 RX ADMIN — SENNOSIDES 2 TABLET: 8.6 TABLET, FILM COATED ORAL at 08:39

## 2022-02-09 RX ADMIN — APIXABAN 10 MG: 5 TABLET, FILM COATED ORAL at 11:53

## 2022-02-09 RX ADMIN — DEXAMETHASONE 6 MG: 6 TABLET ORAL at 08:39

## 2022-02-09 RX ADMIN — Medication 3 MG: at 22:34

## 2022-02-09 RX ADMIN — POLYETHYLENE GLYCOL 3350 17 G: 17 POWDER, FOR SOLUTION ORAL at 15:02

## 2022-02-09 RX ADMIN — AMLODIPINE BESYLATE 5 MG: 5 TABLET ORAL at 11:53

## 2022-02-09 RX ADMIN — SENNOSIDES 2 TABLET: 8.6 TABLET, FILM COATED ORAL at 20:53

## 2022-02-09 RX ADMIN — POLYETHYLENE GLYCOL 3350 17 G: 17 POWDER, FOR SOLUTION ORAL at 20:53

## 2022-02-09 RX ADMIN — SODIUM CHLORIDE, PRESERVATIVE FREE 10 ML: 5 INJECTION INTRAVENOUS at 20:56

## 2022-02-09 NOTE — THERAPY EVALUATION
Patient Name: David Nugent  : 1950    MRN: [id number]                              Today's Date: 2022       Admit Date: 2022    Visit Dx:     ICD-10-CM ICD-9-CM   1. Multiple subsegmental pulmonary emboli without acute cor pulmonale (HCC)  I26.94 415.19   2. Pneumonia due to COVID-19 virus  U07.1 480.8    J12.82 079.89     Patient Active Problem List   Diagnosis   • Multiple subsegmental pulmonary emboli without acute cor pulmonale (HCC)   • Pneumonia due to COVID-19 virus   • HTN (hypertension)   • GERD without esophagitis   • Acute deep vein thrombosis of calf, left (HCC)   • Chronic constipation   • Nausea & vomiting     Past Medical History:   Diagnosis Date   • Constipation    • GERD (gastroesophageal reflux disease)    • History of pulmonary embolus (PE)    • Hypertension      History reviewed. No pertinent surgical history.   General Information     Row Name 22 162          Physical Therapy Time and Intention    Document Type discharge evaluation/summary  -     Mode of Treatment individual therapy; physical therapy  -[facility] Name 22 1620          General Information    Patient Profile Reviewed yes  -     Prior Level of Function independent:; gait; transfer; bed mobility  -     Existing Precautions/Restrictions no known precautions/restrictions  -     Barriers to Rehab none identified  -[facility] Name 22 162          Living Environment    Lives With spouse; child(shane), adult  -[facility] Name 22 162          Home Main Entrance    Number of Stairs, Main Entrance one  -[facility] Name 22 1620          Stairs Within Home, Primary    Number of Stairs, Within Home, Primary none  -[facility] Name 22 162          Cognition    Orientation Status (Cognition) oriented x 4  -[facility] Name 22 162          Safety Issues, Functional Mobility    Impairments Affecting Function (Mobility) endurance/activity tolerance; shortness of breath; strength   -           User Key  (r) = Recorded By, (t) = Taken By, (c) = Cosigned By    Initials Name Provider Type     Sera Glasgow Physical Therapist               Mobility     Row Name 02/09/22 1621          Bed Mobility    Bed Mobility supine-sit; sit-supine  -     Supine-Sit P[place] (Bed Mobility) modified independence; verbal cues  -     Sit-Supine P[place] (Bed Mobility) modified independence; verbal cues  -     Assistive Device (Bed Mobility) bed rails; head of bed elevated  -     Row Name 02/09/22 1621          Sit-Stand Transfer    Sit-Stand P[place] (Transfers) modified independence; verbal cues  -     Assistive Device (Sit-Stand Transfers) walker, front-wheeled  -     Row Name 02/09/22 1621          Gait/Stairs (Locomotion)    [place] Level (Gait) modified independence; supervision; verbal cues  -     Assistive Device (Gait) walker, front-wheeled  -     Distance in Feet (Gait) 120ft  -     Deviations/Abnormal Patterns (Gait) gait speed decreased; stride length decreased  -     Bilateral Gait Deviations forward flexed posture  -     [place] Level (Stairs) not tested  -     Comment (Gait/Stairs) Tolerated gait well, laps in room 2/2 COVID isolation, no unsteadiness or LOB noted  -           User Key  (r) = Recorded By, (t) = Taken By, (c) = Cosigned By    Initials Name Provider Type     Sera Glasgow Physical Therapist               Obj/Interventions     Row Name 02/09/22 1622          Range of Motion Comprehensive    General Range of Motion no range of motion deficits identified  -     Row Name 02/09/22 1622          Strength Comprehensive (MMT)    General Manual Muscle Testing (MMT) Assessment lower extremity strength deficits identified  -     Comment, General Manual Muscle Testing (MMT) Assessment Generalized weakness, BLE grossly 4/5  -     Row Name 02/09/22 1622          Motor Skills    Therapeutic Exercise --  10 reps B AP/LAQ/seated marches  -      [facility] Name 02/09/22 1622          Balance    Balance Assessment sitting static balance; standing static balance  -     Static Sitting Balance WFL; unsupported; sitting, edge of bed  -     Static Standing Balance WFL; unsupported; standing  -[facility] Name 02/09/22 1622          Sensory Assessment (Somatosensory)    Sensory Assessment (Somatosensory) sensation intact  -           User Key  (r) = Recorded By, (t) = Taken By, (c) = Cosigned By    Initials Name Provider Type     Sera Glasgow Physical Therapist               Goals/Plan    No documentation.                Clinical Impression     [facility] Name 02/09/22 1628          Pain    Additional Documentation Pain Scale: Numbers Pre/Post-Treatment (Group)  -[facility] Name 02/09/22 1628          Pain Scale: Numbers Pre/Post-Treatment    Pretreatment Pain Rating 0/10 - no pain  -     Posttreatment Pain Rating 0/10 - no pain  -[facility] Name 02/09/22 1628          Plan of Care Review    Plan of Care Reviewed With patient  -     Outcome Summary Pt is a 73 yo M admitted with SOA with minimal activity - found to be COVID+. Pt lives with his wife and adult son, has 1 TEDDY, and intermittently uses a rwx at BL. Pt reports at BL, he can only tolerate 5-10 minutes of activity before requiring a seated rest break. Pt presents to PT with minimal functional deficits compared to BL. Pt completed bed mobility and STS with mod I using rwx. Pt ambulated 120ft in room 2/2 COVID isolation, used rwx, and had no instances of unsteadiness or LOB. Pt fatigued after a few laps in room and requested return to chair, however pt reports this is normal for him and he feels at his BL. PT discussed HHPT vs OPPT to work on improving strength and endurance and pt in agreement pending transportation. Pt denies any acute PT needs, but would benefit from continued therapy at D/C to address deficits - HHPT vs OPPT per pt preference.  -[facility] Name 02/09/22 1628          Therapy  Assessment/Plan (PT)    Patient/Family Therapy Goals Statement (PT) Return to PLOF  -     Row Name 02/09/22 1628          Positioning and Restraints    Pre-Treatment Position in bed  -     Post Treatment Position bed  -     In Bed supine; call light within reach; encouraged to call for assist  -           User Key  (r) = Recorded By, (t) = Taken By, (c) = Cosigned By    Initials Name Provider Type     Sera Glasgow Physical Therapist               Outcome Measures     Row Name 02/09/22 1633 02/09/22 0833       How much help from another person do you currently need...    Turning from your back to your side while in flat bed without using bedrails? 4  - 4  -JL    Moving from lying on back to sitting on the side of a flat bed without bedrails? 4  - 4  -JL    Moving to and from a bed to a chair (including a wheelchair)? 4  - 4  -JL    Standing up from a chair using your arms (e.g., wheelchair, bedside chair)? 4  - 4  -JL    Climbing 3-5 steps with a railing? 4  - 4  -JL    To walk in hospital room?   - 4  -JL    AM-PAC 6 Clicks Score (PT) 24  - 24  -    Row Name 02/09/22 1633          Functional Assessment    Outcome Measure Options AM-PAC 6 Clicks Basic Mobility (PT)  -           User Key  (r) = Recorded By, (t) = Taken By, (c) = Cosigned By    Initials Name Provider Type    Nick Willoughby, RN Registered Nurse     Sera Glasgow Physical Therapist                             Physical Therapy Education                 Title: PT OT SLP Therapies (Done)     Topic: Physical Therapy (Done)     Point: Mobility training (Done)     Learning Progress Summary           Patient Acceptance, E,TB,D, VU by  at 2/9/2022 1633                   Point: Home exercise program (Done)     Learning Progress Summary           Patient Acceptance, E,TB,D, VU by  at 2/9/2022 1633                   Point: Body mechanics (Done)     Learning Progress Summary           Patient Acceptance, E,TB,D, VU by  at  2/9/2022 1633                   Point: Precautions (Done)     Learning Progress Summary           Patient Acceptance, E,TB,D, VU by  at 2/9/2022 1633                               User Key     Initials Effective Dates Name Provider Type Discipline     05/10/21 -  Sera Glasgow Physical Therapist PT              PT Recommendation and Plan     Plan of Care Reviewed With: patient  Outcome Summary: Pt is a 73 yo M admitted with SOA with minimal activity - found to be COVID+. Pt lives with his wife and adult son, has 1 TEDDY, and intermittently uses a rwx at BL. Pt reports at BL, he can only tolerate 5-10 minutes of activity before requiring a seated rest break. Pt presents to PT with minimal functional deficits compared to BL. Pt completed bed mobility and STS with mod I using rwx. Pt ambulated 120ft in room 2/2 COVID isolation, used rwx, and had no instances of unsteadiness or LOB. Pt fatigued after a few laps in room and requested return to chair, however pt reports this is normal for him and he feels at his BL. PT discussed HHPT vs OPPT to work on improving strength and endurance and pt in agreement pending transportation. Pt denies any acute PT needs, but would benefit from continued therapy at D/C to address deficits - HHPT vs OPPT per pt preference.     Time Calculation:    PT Charges     Row Name 02/09/22 1634             Time Calculation    Start Time 1558  -      Stop Time 1618  -      Time Calculation (min) 20 min  -      PT Received On 02/09/22  -              Time Calculation- PT    Total Timed Code Minutes- PT 15 minute(s)  -              Timed Charges    66229 - PT Therapeutic Exercise Minutes 5  -      61600 - Gait Training Minutes  10  -              Untimed Charges    PT Eval/Re-eval Minutes 5  -              Total Minutes    Timed Charges Total Minutes 15  -BH      Untimed Charges Total Minutes 5  -BH       Total Minutes 20  -BH            User Key  (r) = Recorded By, (t) = Taken By,  (c) = Cosigned By    Initials Name Provider Type     Sera Glasgow Physical Therapist              Therapy Charges for Today     Code Description Service Date Service Provider Modifiers Qty    21087777515 HC GAIT TRAINING EA 15 MIN 2/9/2022 Sera Glasgow GP 1    68294958976 HC PT EVAL LOW COMPLEXITY 2 2/9/2022 Sera Glasgow GP 1          PT G-Codes  Outcome Measure Options: AM-PAC 6 Clicks Basic Mobility (PT)  AM-PAC 6 Clicks Score (PT): 24    SERA GLASGOW  2/9/2022

## 2022-02-09 NOTE — PLAN OF CARE
Goal Outcome Evaluation:  Plan of Care Reviewed With: patient           Outcome Summary: Pt is a 71 yo M admitted with SOA with minimal activity - found to be COVID+. Pt lives with his wife and adult son, has 1 TEDDY, and intermittently uses a rwx at BL. Pt reports at BL, he can only tolerate 5-10 minutes of activity before requiring a seated rest break. Pt presents to PT with minimal functional deficits compared to BL. Pt completed bed mobility and STS with mod I using rwx. Pt ambulated 120ft in room 2/2 COVID isolation, used rwx, and had no instances of unsteadiness or LOB. Pt fatigued after a few laps in room and requested return to chair, however pt reports this is normal for him and he feels at his BL. PT discussed HHPT vs OPPT to work on improving strength and endurance and pt in agreement pending transportation. Pt denies any acute PT needs, but would benefit from continued therapy at D/C to address deficits - HHPT vs OPPT per pt preference.    Patient was intermittently wearing a face mask during this therapy encounter. Therapist used appropriate personal protective equipment including gown, eye protection, mask and gloves.  Mask used was N95/duckbill. Appropriate PPE was worn during the entire therapy session. Hand hygiene was completed before and after therapy session. Patient is in enhanced droplet precautions.

## 2022-02-09 NOTE — PROGRESS NOTES
"                                              LOS: 2 days   Patient Care Team:  Provider, No Known as PCP - General    Chief Complaint:  F/up COVID-19 and PE    Subjective   Interval History   No hemoptysis. Cough improved. No dyspnea at rest. On RA    REVIEW OF SYSTEMS:       GASTROINTESTINAL: No anorexia, nausea, vomiting or diarrhea. No abdominal pain.  CONSTITUTIONAL: No fever or chills.     Ventilator/Non-Invasive Ventilation Settings (From admission, onward)            None                Physical Exam:     Vital Signs  Temp:  [97.1 °F (36.2 °C)-98.3 °F (36.8 °C)] 97.1 °F (36.2 °C)  Heart Rate:  [68-80] 75  Resp:  [16-18] 16  BP: (131-171)/(89-98) 171/95    Intake/Output Summary (Last 24 hours) at 2/9/2022 1740  Last data filed at 2/9/2022 1504  Gross per 24 hour   Intake 960 ml   Output 1950 ml   Net -990 ml     Flowsheet Rows      First Filed Value   Admission Height 175.3 cm (69\") Documented at 02/07/2022 1121   Admission Weight 81.6 kg (180 lb) Documented at 02/07/2022 1121          PPE used per hospital policy    General Appearance:   Alert, cooperative, in no acute distress   ENMT:  Mallampati score 3, moist mucous membrane   Eyes:  Pupils equal and reactive to light. EOMI   Neck:   Large. Trachea midline. No thyromegaly.   Lungs:    Equal and good air entry bilaterally. Minimal basilar crackles.     Heart:   Regular rhythm and normal rate, normal S1 and S2, no         murmur   Skin:   No rash or ecchymosis   Abdomen:     Soft. No tenderness. No HSM.   Neuro/psych:  Conscious, alert, oriented x3. Strength 5/5 in upper and lower  ext.  Appropriate mood and affect   Extremities:  No cyanosis, clubbing or edema.  Warm extremities and well-perfused          Results Review:        Results from last 7 days   Lab Units 02/09/22  0634 02/08/22  0613 02/08/22  0613 02/07/22  1134 02/07/22  1134   SODIUM mmol/L 135*  --  136  --  136   POTASSIUM mmol/L 4.3  --  3.9  --  4.3   CHLORIDE mmol/L 100  --  102  --  101 "   CO2 mmol/L 23.4  --  23.5  --  27.0   BUN mg/dL 12  --  12  --  14   CREATININE mg/dL 1.06  --  1.16  --  1.63*   GLUCOSE mg/dL 124*   < > 87   < > 95   CALCIUM mg/dL 9.0  --  8.8  --  9.2    < > = values in this interval not displayed.     Results from last 7 days   Lab Units 02/07/22  1134   TROPONIN T ng/mL <0.010     Results from last 7 days   Lab Units 02/09/22  0635 02/08/22  0613 02/07/22  1134   WBC 10*3/mm3 2.14* 2.85* 2.88*   HEMOGLOBIN g/dL 13.7 12.7* 13.3   HEMATOCRIT % 40.1 38.0 40.0   PLATELETS 10*3/mm3 197 171 165     Results from last 7 days   Lab Units 02/09/22  0819 02/08/22  2305 02/08/22  1600 02/07/22  2103 02/07/22  1134   INR   --   --   --   --  1.00   APTT seconds 89.7* 100.4* 76.5*   < > 31.8    < > = values in this interval not displayed.     Results from last 7 days   Lab Units 02/07/22  1134   PROBNP pg/mL 589.0       Results from last 7 days   Lab Units 02/07/22  1134   D DIMER QUANT MCGFEU/mL 2.02*       Results from last 7 days   Lab Units 02/09/22  0634 02/08/22  0613   CRP mg/dL 3.92* 5.16*               I reviewed the patient's new clinical results.        Medication Review:   amLODIPine, 5 mg, Oral, Q24H  apixaban, 10 mg, Oral, Q12H   Followed by  [START ON 2/16/2022] apixaban, 5 mg, Oral, Q12H  dexamethasone, 6 mg, Oral, Daily With Breakfast  pantoprazole, 40 mg, Oral, Q AM  polyethylene glycol, 17 g, Oral, BID  senna, 2 tablet, Oral, BID            Assessment   1. Acute bilateral PE: Can be provoked by COVID infection  2. Acute LLE SVT and DVT  3. Bilateral COVID-19 pneumonia.  4. Bilateral pleural effusion, mild.  5. Elevated CRP, improving  6. Low oxygen saturation  7. Subcutaneous nodules      Plan       · Anticoagulation: Lovenox stopped and started on Eliquis  · Dexamethasone 6 mg daily while being hospitalized.  On discharge it can be titrated off over 7-10 days  · Incentive spirometry    Okay to discharge home from pulmonary perspective. Since he's going to follow with  hematology for VTE, there's no need for pulmonary follow up. It's expected that his COVID PNA will completely resolve overtime      Charles Hernandez MD  02/09/22  17:40 EST          This note was dictated utilizing Dragon dictation

## 2022-02-09 NOTE — PROGRESS NOTES
Name: David Nugent ADMIT: 2022   : 1950  PCP: Provider, No Known    MRN: [id number] LOS: 2 days   AGE/SEX: 72 y.o. male  ROOM: 18/1     Subjective   Subjective   Feeling fine today. No SOA overnight. Today denies any SOA at rest, but still gets some mild ANN. No chest pain. Cough improved. Voiding without issue.   Vomited his breakfast--he did not tell me this, RN did at [facility]. Pt said he suffers from reflux. Also suffers chronic constipation. Sometimes goes 2 weeks without BM at home. No further N/V at this time.  No D/F/C/NS/HA/vis changes/abd pain/LUTS.    Review of Systems     as above  Objective   Objective   Vital Signs  Temp:  [97.3 °F (36.3 °C)-98.3 °F (36.8 °C)] 97.3 °F (36.3 °C)  Heart Rate:  [68-80] 80  Resp:  [16-18] 18  BP: (127-161)/(58-98) 152/98  SpO2:  [90 %-98 %] 94 %  on   ;   Device (Oxygen Therapy): room air  Body mass index is 29.36 kg/m².  Physical Exam  Vitals and nursing note reviewed.   Constitutional:       General: He is not in acute distress.     Appearance: He is not ill-appearing, toxic-appearing or diaphoretic.   HENT:      Head: Normocephalic and atraumatic.      Right Ear: External ear normal.      Left Ear: External ear normal.      Nose: Nose normal.      Mouth/Throat:      Mouth: Mucous membranes are moist.      Pharynx: Oropharynx is clear.   Eyes:      General: No scleral icterus.        Right eye: No discharge.         Left eye: No discharge.      Extraocular Movements: Extraocular movements intact.      Conjunctiva/sclera: Conjunctivae normal.   Cardiovascular:      Rate and Rhythm: Normal rate and regular rhythm.      Heart sounds: Normal heart sounds.   Pulmonary:      Effort: Pulmonary effort is normal. No respiratory distress.      Breath sounds: Normal breath sounds. No wheezing.   Abdominal:      General: Bowel sounds are normal. There is no distension.      Palpations: Abdomen is soft.      Tenderness: There is no abdominal tenderness.    Musculoskeletal:         General: No swelling or deformity. Normal range of motion.      Cervical back: Neck supple. No rigidity.   Lymphadenopathy:      Cervical: No cervical adenopathy.   Skin:     General: Skin is warm and dry.      Capillary Refill: Capillary refill takes less than 2 seconds.      Coloration: Skin is not jaundiced.      Findings: No rash.      Comments: subQ nodules around wrists, ankles, and one on posterior neck, all NT, no skin changes, all 1.5cm or less, mobile   Neurological:      General: No focal deficit present.      Mental Status: He is alert and oriented to person, place, and time. Mental status is at baseline.      Cranial Nerves: No cranial nerve deficit.      Coordination: Coordination normal.   Psychiatric:         Mood and Affect: Mood normal.         Behavior: Behavior normal.         Thought Content: Thought content normal.      Comments: Very pleasant         Results Review     I reviewed the patient's new clinical results.  Results from last 7 days   Lab Units 02/09/22  0635 02/08/22  0613 02/07/22  1134   WBC 10*3/mm3 2.14* 2.85* 2.88*   HEMOGLOBIN g/dL 13.7 12.7* 13.3   PLATELETS 10*3/mm3 197 171 165     Results from last 7 days   Lab Units 02/09/22  0634 02/08/22  0613 02/07/22  1134   SODIUM mmol/L 135* 136 136   POTASSIUM mmol/L 4.3 3.9 4.3   CHLORIDE mmol/L 100 102 101   CO2 mmol/L 23.4 23.5 27.0   BUN mg/dL 12 12 14   CREATININE mg/dL 1.06 1.16 1.63*   GLUCOSE mg/dL 124* 87 95   EGFR IF AFRICN AM mL/min/1.73 83 75 50*     Results from last 7 days   Lab Units 02/09/22  0634 02/07/22  1134   ALBUMIN g/dL 3.00* 3.20*   BILIRUBIN mg/dL 0.6 0.8   ALK PHOS U/L 63 61   AST (SGOT) U/L 27 17   ALT (SGPT) U/L 23 14     Results from last 7 days   Lab Units 02/09/22  0634 02/08/22  0613 02/07/22  1134   CALCIUM mg/dL 9.0 8.8 9.2   ALBUMIN g/dL 3.00*  --  3.20*   MAGNESIUM mg/dL 1.9  --   --      Results from last 7 days   Lab Units 02/09/22  0634 02/07/22  1134   PROCALCITONIN  ng/mL 0.12 0.23   LACTATE mmol/L  --  1.1     COVID19   Date Value Ref Range Status   02/07/2022 Detected (C) Not Detected - Ref. Range Final     No results found for: HGBA1C, POCGLU    Duplex Venous Lower Extremity - Bilateral CAR  · Acute left lower extremity deep vein thrombosis noted in the peroneal.  · Acute left lower extremity superficial thrombophlebitis noted in the   small saphenous.  · All other veins appeared normal bilaterally.     CT Angiogram Chest  Narrative: CT ANGIOGRAM OF THE CHEST. MULTIPLE CORONAL, SAGITTAL, AND 3D  RECONSTRUCTIONS     HISTORY: 72-year-old male with increasing dyspnea. Pulmonary  thromboemboli history in the past.     TECHNIQUE: Radiation dose reduction techniques were utilized, including  automated exposure control and exposure modulation based on body size.   CT angiogram of the chest was performed following the administration of  IV contrast. Multiple coronal, sagittal, and 3D reconstruction images  were obtained. There is no previous CT for comparison.     FINDINGS: There is adequate opacification of the pulmonary arteries and  there are multiple small pulmonary thromboemboli. The largest pulmonary  thromboembolus is within the lobar right upper lobe pulmonary artery  extending into segmental branches. This thromboembolus is best seen on  the coronal reconstruction series. Segmental lingular pulmonary artery  and branches. There are segmental right and lower lobe pulmonary  thromboemboli and subsegmental pulmonary thromboemboli at the left lower  lobe. The RV:LV ratio is less than 1 (2.3 cm/2.5 cm). There are mixed  density airspace consolidations at the right lower lobe and to a lesser  degree the left lower lobe. There are also predominantly peripheral  airspace opacities at the right upper lobe and faint ground glass  density within the lingula. There is a small right pleural effusion and  a tiny left pleural effusion. There is a small pericardial effusion as  well. There  is no pathologic-appearing lymphadenopathy within the chest.  The esophagus appears patulous. There is an approximately 3 cm  subcutaneous low-attenuation lesion at the midline of the mid chest and  there is also significant thickening of the skin throughout much of the  anterior upper and mid chest.     Impression: 1. There are bilateral pulmonary thromboemboli. The largest is within  the lobar right upper lobe pulmonary artery. RV:LV ratio is less than 1.  2. The right lower lobe airspace consolidations have an appearance most  suspicious for sequela of pulmonary thromboemboli. The ground glass  opacities present bilaterally are suspected to represent COVID  pneumonia.  3. Small pericardial effusion. Small right pleural effusion and tiny  left pleural effusion. Patulous appearance of the esophagus. Query  history of achalasia and esophageal dysmotility.  4. Significant thickening of the skin at the anterior aspect of the  upper and mid chest and an approximately 3 cm subcutaneous lesion at the  mid line subcutaneous tissues of the mid chest. The rounded lesion may  represent a complex sebaceous cyst, but correlate the skin thickening  clinically.     Discussed with Dr. Pisano.     This report was finalized on 2/8/2022 6:48 AM by Dr. Kelley Block M.D.       Scheduled Medications  amLODIPine, 5 mg, Oral, Q24H  apixaban, 10 mg, Oral, Q12H   Followed by  [START ON 2/16/2022] apixaban, 5 mg, Oral, Q12H  dexamethasone, 6 mg, Oral, Daily With Breakfast  pantoprazole, 40 mg, Oral, Q AM  polyethylene glycol, 17 g, Oral, BID  senna, 2 tablet, Oral, BID    Infusions  heparin, 18 Units/kg/hr, Last Rate: 10 Units/kg/hr (02/09/22 0041)    Diet  Diet Regular; Cardiac       Assessment/Plan     Active Hospital Problems    Diagnosis  POA   • **Multiple subsegmental pulmonary emboli without acute cor pulmonale (HCC) [I26.94]  Yes   • Chronic constipation [K59.09]  Yes   • Nausea & vomiting [R11.2]  No   • Pneumonia due to COVID-19  virus [U07.1, J12.82]  Yes   • HTN (hypertension) [I10]  Yes   • GERD without esophagitis [K21.9]  Yes   • Acute deep vein thrombosis of calf, left (HCC) [I82.4Z2]  Yes      Resolved Hospital Problems   No resolved problems to display.     71yo gentleman who presented to ER with c/o 3 weeks of increasing ANN, and 2 weeks of productive cough and subjective fever. Found to have COViD pneumonia as well as bilateral PEs.    Bilateral PEs and left CVT: has h/o bilateral PEs in late 2020 and completed 3-4 month course of anticoagulation (Eliquis) in January of 2021.   -Suspect this VTE is related to COViD but given that this is second episode asked Heme/Onc to see here.   -They have ordered hypercoag w/u and recommend f/u with them in office to review results.   -Okay to change heparin gtt to DOAC (Eliquis).   -No evidence of right heart strain on CT.    COViD pneumonia: RA O2 sats were below 94% so Pulm has started Decadron. Has been symptomatic too long to consider Remdesivir. Monitoring inflammatory markers--ferritin up today. His PCT was 0.23, repeat this AM down to 0.12. No abx indicated.    HTN: takes amlodipine at home--on hold at present. BPs up today so will restart.    GERD/N/V/constipation: continue PPI. Check KUB. Adjust bowel regimen.    Subcutaneous nodules: have been present/stable for 8 years. I struggle to relate these to his PEs. Defer to Pulm.       Full AC should suffice for DVT prophylaxis.  Full code.  Discussed with patient, nursing staff, CCP and care team on multidisciplinary rounds.  Anticipate discharge home with family, maybe as early as tomorrow.    During all interaction with pt proper PPE was utilized (gown, gloves, mask, goggles, and face shield) and was donned/doffed according to recommendations. Hands were cleaned before and after interaction.    Faisal Todd MD  [facility]  02/09/22  11:03 EST

## 2022-02-10 ENCOUNTER — READMISSION MANAGEMENT (OUTPATIENT)
Dept: CALL CENTER | Facility: HOSPITAL | Age: 72
End: 2022-02-10

## 2022-02-10 VITALS
TEMPERATURE: 97 F | HEART RATE: 69 BPM | OXYGEN SATURATION: 97 % | WEIGHT: 192.4 LBS | DIASTOLIC BLOOD PRESSURE: 90 MMHG | BODY MASS INDEX: 28.5 KG/M2 | SYSTOLIC BLOOD PRESSURE: 150 MMHG | HEIGHT: 69 IN | RESPIRATION RATE: 16 BRPM

## 2022-02-10 LAB
ALBUMIN SERPL-MCNC: 3.1 G/DL (ref 3.5–5.2)
ALBUMIN/GLOB SERPL: 0.7 G/DL
ALP SERPL-CCNC: 63 U/L (ref 39–117)
ALT SERPL W P-5'-P-CCNC: 25 U/L (ref 1–41)
ANION GAP SERPL CALCULATED.3IONS-SCNC: 10.7 MMOL/L (ref 5–15)
AST SERPL-CCNC: 27 U/L (ref 1–40)
BASOPHILS # BLD AUTO: 0.01 10*3/MM3 (ref 0–0.2)
BASOPHILS NFR BLD AUTO: 0.2 % (ref 0–1.5)
BILIRUB SERPL-MCNC: 0.6 MG/DL (ref 0–1.2)
BUN SERPL-MCNC: 14 MG/DL (ref 8–23)
BUN/CREAT SERPL: 13.3 (ref 7–25)
CALCIUM SPEC-SCNC: 9.8 MG/DL (ref 8.6–10.5)
CARDIOLIPIN IGG SER IA-ACNC: <9 GPL U/ML (ref 0–14)
CARDIOLIPIN IGM SER IA-ACNC: 19 MPL U/ML (ref 0–12)
CHLORIDE SERPL-SCNC: 102 MMOL/L (ref 98–107)
CO2 SERPL-SCNC: 25.3 MMOL/L (ref 22–29)
CREAT SERPL-MCNC: 1.05 MG/DL (ref 0.76–1.27)
CRP SERPL-MCNC: 2.21 MG/DL (ref 0–0.5)
DEPRECATED RDW RBC AUTO: 43.1 FL (ref 37–54)
EOSINOPHIL # BLD AUTO: 0 10*3/MM3 (ref 0–0.4)
EOSINOPHIL NFR BLD AUTO: 0 % (ref 0.3–6.2)
ERYTHROCYTE [DISTWIDTH] IN BLOOD BY AUTOMATED COUNT: 14 % (ref 12.3–15.4)
FERRITIN SERPL-MCNC: 916 NG/ML (ref 30–400)
GFR SERPL CREATININE-BSD FRML MDRD: 84 ML/MIN/1.73
GLOBULIN UR ELPH-MCNC: 4.3 GM/DL
GLUCOSE SERPL-MCNC: 104 MG/DL (ref 65–99)
HCT VFR BLD AUTO: 40.9 % (ref 37.5–51)
HGB BLD-MCNC: 13.6 G/DL (ref 13–17.7)
IMM GRANULOCYTES # BLD AUTO: 0.01 10*3/MM3 (ref 0–0.05)
IMM GRANULOCYTES NFR BLD AUTO: 0.2 % (ref 0–0.5)
LYMPHOCYTES # BLD AUTO: 0.6 10*3/MM3 (ref 0.7–3.1)
LYMPHOCYTES NFR BLD AUTO: 14.7 % (ref 19.6–45.3)
MAGNESIUM SERPL-MCNC: 2 MG/DL (ref 1.6–2.4)
MCH RBC QN AUTO: 27.9 PG (ref 26.6–33)
MCHC RBC AUTO-ENTMCNC: 33.3 G/DL (ref 31.5–35.7)
MCV RBC AUTO: 84 FL (ref 79–97)
MONOCYTES # BLD AUTO: 0.43 10*3/MM3 (ref 0.1–0.9)
MONOCYTES NFR BLD AUTO: 10.5 % (ref 5–12)
NEUTROPHILS NFR BLD AUTO: 3.04 10*3/MM3 (ref 1.7–7)
NEUTROPHILS NFR BLD AUTO: 74.4 % (ref 42.7–76)
NRBC BLD AUTO-RTO: 0 /100 WBC (ref 0–0.2)
PLATELET # BLD AUTO: 218 10*3/MM3 (ref 140–450)
PMV BLD AUTO: 10.7 FL (ref 6–12)
POTASSIUM SERPL-SCNC: 4.5 MMOL/L (ref 3.5–5.2)
PROT SERPL-MCNC: 7.4 G/DL (ref 6–8.5)
PROTHROM ACT/NOR PPP: 88 % (ref 50–154)
RBC # BLD AUTO: 4.87 10*6/MM3 (ref 4.14–5.8)
SODIUM SERPL-SCNC: 138 MMOL/L (ref 136–145)
WBC NRBC COR # BLD: 4.09 10*3/MM3 (ref 3.4–10.8)

## 2022-02-10 PROCEDURE — 82728 ASSAY OF FERRITIN: CPT | Performed by: HOSPITALIST

## 2022-02-10 PROCEDURE — 83735 ASSAY OF MAGNESIUM: CPT | Performed by: HOSPITALIST

## 2022-02-10 PROCEDURE — 85025 COMPLETE CBC W/AUTO DIFF WBC: CPT | Performed by: HOSPITALIST

## 2022-02-10 PROCEDURE — 86140 C-REACTIVE PROTEIN: CPT | Performed by: HOSPITALIST

## 2022-02-10 PROCEDURE — 80053 COMPREHEN METABOLIC PANEL: CPT | Performed by: HOSPITALIST

## 2022-02-10 RX ORDER — DEXAMETHASONE 6 MG/1
6 TABLET ORAL
Qty: 7 TABLET | Refills: 0 | Status: SHIPPED | OUTPATIENT
Start: 2022-02-11 | End: 2022-02-18

## 2022-02-10 RX ORDER — SENNA PLUS 8.6 MG/1
2 TABLET ORAL 2 TIMES DAILY
Qty: 40 TABLET | Refills: 0 | Status: SHIPPED | OUTPATIENT
Start: 2022-02-10

## 2022-02-10 RX ORDER — POLYETHYLENE GLYCOL 3350 17 G/17G
17 POWDER, FOR SOLUTION ORAL 2 TIMES DAILY
Qty: 850 G | Refills: 0 | Status: SHIPPED | OUTPATIENT
Start: 2022-02-10

## 2022-02-10 RX ADMIN — PANTOPRAZOLE SODIUM 40 MG: 40 TABLET, DELAYED RELEASE ORAL at 06:31

## 2022-02-10 RX ADMIN — ACETAMINOPHEN 650 MG: 325 TABLET, FILM COATED ORAL at 09:51

## 2022-02-10 RX ADMIN — AMLODIPINE BESYLATE 5 MG: 5 TABLET ORAL at 09:45

## 2022-02-10 RX ADMIN — POLYETHYLENE GLYCOL 3350 17 G: 17 POWDER, FOR SOLUTION ORAL at 09:45

## 2022-02-10 RX ADMIN — APIXABAN 10 MG: 5 TABLET, FILM COATED ORAL at 09:45

## 2022-02-10 RX ADMIN — SENNOSIDES 2 TABLET: 8.6 TABLET, FILM COATED ORAL at 09:44

## 2022-02-10 RX ADMIN — DEXAMETHASONE 6 MG: 6 TABLET ORAL at 09:44

## 2022-02-10 NOTE — PROGRESS NOTES
Continued Stay Note  [facility]     Patient Name: David Nugent  MRN: [id number]  Today's Date: 2/10/2022    Admit Date: 2/7/2022     Discharge Plan     Row Name 02/10/22 1451       Plan    Plan Home with spouse    Plan Comments Spoke with the patient and he prefers to go home at [place] with his spouse.  He can afford his medications at [place].  His spouse will transport.  He denies need for rehab services. He verbalized understanding that [facility] ..........................Deandra Najera RN               Discharge Codes    No documentation.               Expected Discharge Date and Time     Expected Discharge Date Expected Discharge Time    Feb 10, 2022             Deandra Najera RN

## 2022-02-10 NOTE — DISCHARGE SUMMARY
Patient Name: David Nugent  : 1950  MRN: [id number]    Date of Admission: 2022  Date of Discharge:  2/10/2022  Primary Care Physician: Provider, No Known      Chief Complaint:   Shortness of Breath and Fatigue      Discharge Diagnoses     Active Hospital Problems    Diagnosis  POA   • **Multiple subsegmental pulmonary emboli without acute cor pulmonale (HCC) [I26.94]  Yes   • Chronic constipation [K59.09]  Yes   • Nausea & vomiting [R11.2]  No   • Pneumonia due to COVID-19 virus [U07.1, J12.82]  Yes   • HTN (hypertension) [I10]  Yes   • GERD without esophagitis [K21.9]  Yes   • Acute deep vein thrombosis of calf, left (HCC) [I82.4Z2]  Yes      Resolved Hospital Problems   No resolved problems to display.        Hospital Course     71yo gentleman who presented to ER with c/o 3 weeks of increasing ANN, and 2 weeks of productive cough and subjective fever. Found to have COViD pneumonia as well as bilateral PEs. Please see below for details of admission:     Bilateral PEs and left CVT: has h/o bilateral PEs in late  and completed 3-4 month course of anticoagulation (Eliquis) in 2021.   -Suspect this VTE is related to COViD but given that this is second episode asked Heme/Onc to see here.   -They have ordered hypercoag w/u and recommend f/u with them in office to review results on .   -Changed heparin gtt to DOAC (Eliquis) and is tolerating fine.   -No evidence of right heart strain on CT.  -Will require at least 6 months of AC, possibly lifetime--defer to Heme/Onc  -Explained to pt that he needs to f/u with PCP at [facility] to get refills on Eliquis arranged.     COViD pneumonia: RA O2 sats were below 94% so Pulm started Decadron. Has been symptomatic too long to consider Remdesivir. Monitoring inflammatory markers--ferritin and CRP both falling today. His PCT was 0.23, repeat was down to 0.12. No abx indicated. Okay for dc today per Pulm but they recommend he complete course of  Decadron at home. He remains stable on RA here.     HTN: continue Amlodipine.     GERD/N/V/constipation: much better today, continue PPI. KUB unremarkable. Home on bowel regimen.     Subcutaneous nodules: have been present/stable for 8 years. I struggle to relate these to his PEs. Defer to PCP at f/u.         · Full AC sufficed for DVT prophylaxis.  · Full code confirmed.  · Discussed with patient, nursing staff, CCP and care team on multidisciplinary rounds.  · Discharge home today. Heme/Onc and Pulm okay with dc home today.     During all interaction with pt proper PPE was utilized (gown, gloves, mask, goggles, and face shield) and was donned/doffed according to recommendations. Hands were cleaned before and after interaction.    Day of Discharge     Subjective:  Feeling fine today. No SOA overnight. Today denies any SOA at rest, but still gets some mild ANN. No chest pain. Cough improved. Voiding without issue. No further N/V at this time. Still no BM but says this is not unusual for him. Plans to continue Miralax and Senokot-S at [facility].  No D/F/C/NS/HA/vis changes/abd pain/LUTS.    Physical Exam:  Temp:  [96.5 °F (35.8 °C)-98 °F (36.7 °C)] 97.5 °F (36.4 °C)  Heart Rate:  [69-79] 79  Resp:  [16-18] 16  BP: (155-177)/(89-98) 177/97  Body mass index is 28.41 kg/m².  Physical Exam  Vitals and nursing note reviewed.   Constitutional:       General: He is not in acute distress.     Appearance: He is not ill-appearing, toxic-appearing or diaphoretic.   HENT:      Head: Normocephalic and atraumatic.      Right Ear: External ear normal.      Left Ear: External ear normal.      Nose: Nose normal.      Mouth/Throat:      Mouth: Mucous membranes are moist.      Pharynx: Oropharynx is clear.   Eyes:      General: No scleral icterus.        Right eye: No discharge.         Left eye: No discharge.      Extraocular Movements: Extraocular movements intact.      Conjunctiva/sclera: Conjunctivae normal.   Cardiovascular:      Rate  and Rhythm: Normal rate and regular rhythm.      Heart sounds: Normal heart sounds.   Pulmonary:      Effort: Pulmonary effort is normal. No respiratory distress.      Breath sounds: Normal breath sounds. No wheezing.   Abdominal:      General: Bowel sounds are normal. There is no distension.      Palpations: Abdomen is soft.      Tenderness: There is no abdominal tenderness.   Musculoskeletal:         General: No swelling or deformity. Normal range of motion.      Cervical back: Neck supple. No rigidity.   Lymphadenopathy:      Cervical: No cervical adenopathy.   Skin:     General: Skin is warm and dry.      Capillary Refill: Capillary refill takes less than 2 seconds.      Coloration: Skin is not jaundiced.      Findings: No rash.      Comments: subQ nodules around wrists, ankles, and one on posterior neck, all NT, no skin changes, all 1.5cm or less, mobile   Neurological:      General: No focal deficit present.      Mental Status: He is alert and oriented to person, place, and time. Mental status is at baseline.      Cranial Nerves: No cranial nerve deficit.      Coordination: Coordination normal.   Psychiatric:         Mood and Affect: Mood normal.         Behavior: Behavior normal.         Thought Content: Thought content normal.      Comments: Very pleasant            Consultants     Consult Orders (all) (From admission, onward)     Start     Ordered    02/08/22 1425  Inpatient Pulmonology Consult  Once        Specialty:  Pulmonary Disease  Provider:  Stephan Licea MD    02/08/22 1425    02/08/22 0805  Hematology & Oncology Inpatient Consult  Once        Specialty:  Hematology and Oncology  Provider:  Paul Alexander II, MD    02/08/22 0806    02/07/22 1432  [facility] (on-call MD unless specified) Details  Once        Specialty:  Hospitalist  Provider:  (Not yet assigned)    02/07/22 1431              Procedures     * Surgery not found *      Imaging Results (All)     Procedure Component Value Units Date/Time    US  Abdomen Limited [[id number]] Collected: 02/09/22 1646     Updated: 02/09/22 1704    Narrative:      US ABDOMEN LIMITED-  02/09/2022     HISTORY: Hepatosplenomegaly.     The gallbladder is well-distended with no gallstones wall thickening or  pericholecystic fluid.     Common bile duct is nondilated measuring 5.3 mm.     The liver appears relatively normal in size and echotexture. No focal  hepatic lesions are seen. Small right pleural effusion is seen.     Spleen does not appear enlarged. There is a small left pleural effusion.     Right kidney measures 10.0 cm in length. Left kidney measures 10.2 cm in  length.     The aorta and IVC are not dilated. Pancreas is not well seen but appears  grossly normal.       Impression:      1. No evidence of hepatosplenomegaly.  2. Small bilateral pleural effusions.     This report was finalized on 2/9/2022 5:01 PM by Dr. Wilmer Urias M.D.       XR Abdomen KUB [[id number]] Collected: 02/09/22 1303     Updated: 02/09/22 1307    Narrative:      XR ABDOMEN KUB-     INDICATIONS: Nausea, vomiting, constipation     TECHNIQUE: Supine views of the abdomen     COMPARISON: None available     FINDINGS:      The bowel gas pattern is nonobstructive. Mild colonic fecal retention  is seen. No supine evidence for free intraperitoneal gas. No definite  nephrolithiasis. If there is further clinical concern, CT can be  obtained for further evaluation.       Impression:         As described.     This report was finalized on 2/9/2022 1:03 PM by Dr. Simba Olivares M.D.       CT Angiogram Chest [[id number]] Collected: 02/07/22 1501     Updated: 02/08/22 0651    Narrative:      CT ANGIOGRAM OF THE CHEST. MULTIPLE CORONAL, SAGITTAL, AND 3D  RECONSTRUCTIONS     HISTORY: 72-year-old male with increasing dyspnea. Pulmonary  thromboemboli history in the past.     TECHNIQUE: Radiation dose reduction techniques were utilized, including  automated exposure control and exposure modulation based on body  size.   CT angiogram of the chest was performed following the administration of  IV contrast. Multiple coronal, sagittal, and 3D reconstruction images  were obtained. There is no previous CT for comparison.     FINDINGS: There is adequate opacification of the pulmonary arteries and  there are multiple small pulmonary thromboemboli. The largest pulmonary  thromboembolus is within the lobar right upper lobe pulmonary artery  extending into segmental branches. This thromboembolus is best seen on  the coronal reconstruction series. Segmental lingular pulmonary artery  and branches. There are segmental right and lower lobe pulmonary  thromboemboli and subsegmental pulmonary thromboemboli at the left lower  lobe. The RV:LV ratio is less than 1 (2.3 cm/2.5 cm). There are mixed  density airspace consolidations at the right lower lobe and to a lesser  degree the left lower lobe. There are also predominantly peripheral  airspace opacities at the right upper lobe and faint ground glass  density within the lingula. There is a small right pleural effusion and  a tiny left pleural effusion. There is a small pericardial effusion as  well. There is no pathologic-appearing lymphadenopathy within the chest.  The esophagus appears patulous. There is an approximately 3 cm  subcutaneous low-attenuation lesion at the midline of the mid chest and  there is also significant thickening of the skin throughout much of the  anterior upper and mid chest.       Impression:      1. There are bilateral pulmonary thromboemboli. The largest is within  the lobar right upper lobe pulmonary artery. RV:LV ratio is less than 1.  2. The right lower lobe airspace consolidations have an appearance most  suspicious for sequela of pulmonary thromboemboli. The ground glass  opacities present bilaterally are suspected to represent COVID  pneumonia.  3. Small pericardial effusion. Small right pleural effusion and tiny  left pleural effusion. Patulous appearance  of the esophagus. Query  history of achalasia and esophageal dysmotility.  4. Significant thickening of the skin at the anterior aspect of the  upper and mid chest and an approximately 3 cm subcutaneous lesion at the  mid line subcutaneous tissues of the mid chest. The rounded lesion may  represent a complex sebaceous cyst, but correlate the skin thickening  clinically.     Discussed with Dr. Pisano.     This report was finalized on 2/8/2022 6:48 AM by Dr. Kelley Block M.D.       XR Chest AP [[id number]] Collected: 02/07/22 1158     Updated: 02/07/22 1203    Narrative:      XR CHEST AP-     INDICATIONS: Cough and fever, Covid evaluation     TECHNIQUE: Frontal view of the chest     COMPARISON: None available     FINDINGS:     The heart size is borderline. Pulmonary vasculature is unremarkable.  Aorta is tortuous. Minimal patchy density at the right midlung could be  an area of developing pneumonia, follow-up suggested. No pleural  effusion, or pneumothorax. No acute osseous process.       Impression:         Minimal patchy density at the right midlung could be an area of  developing pneumonia, follow-up suggested. Tortuous aorta.     This report was finalized on 2/7/2022 12:00 PM by Dr. Simba Olivares M.D.           Results for orders placed during the hospital encounter of 02/07/22    Duplex Venous Lower Extremity - Bilateral CAR    Interpretation Summary  · Acute left lower extremity deep vein thrombosis noted in the peroneal.  · Acute left lower extremity superficial thrombophlebitis noted in the small saphenous.  · All other veins appeared normal bilaterally.      Pertinent Labs     Results from last 7 days   Lab Units 02/10/22  0507 02/09/22  0635 02/08/22  0613 02/07/22  1134   WBC 10*3/mm3 4.09 2.14* 2.85* 2.88*   HEMOGLOBIN g/dL 13.6 13.7 12.7* 13.3   PLATELETS 10*3/mm3 218 197 171 165     Results from last 7 days   Lab Units 02/10/22  0507 02/09/22  0634 02/08/22  0613 02/07/22  1134   SODIUM mmol/L 138  135* 136 136   POTASSIUM mmol/L 4.5 4.3 3.9 4.3   CHLORIDE mmol/L 102 100 102 101   CO2 mmol/L 25.3 23.4 23.5 27.0   BUN mg/dL 14 12 12 14   CREATININE mg/dL 1.05 1.06 1.16 1.63*   GLUCOSE mg/dL 104* 124* 87 95   EGFR IF AFRICN AM mL/min/1.73 84 83 75 50*     Results from last 7 days   Lab Units 02/10/22  0507 02/09/22  0634 02/07/22  1134   ALBUMIN g/dL 3.10* 3.00* 3.20*   BILIRUBIN mg/dL 0.6 0.6 0.8   ALK PHOS U/L 63 63 61   AST (SGOT) U/L 27 27 17   ALT (SGPT) U/L 25 23 14     Results from last 7 days   Lab Units 02/10/22  0507 02/09/22  0634 02/08/22  0613 02/07/22  1134   CALCIUM mg/dL 9.8 9.0 8.8 9.2   ALBUMIN g/dL 3.10* 3.00*  --  3.20*   MAGNESIUM mg/dL 2.0 1.9  --   --        Results from last 7 days   Lab Units 02/07/22  1134   TROPONIN T ng/mL <0.010   PROBNP pg/mL 589.0   D DIMER QUANT MCGFEU/mL 2.02*           Invalid input(s): LDLCALC  Results from last 7 days   Lab Units 02/07/22  1323 02/07/22  1134   BLOODCX  No growth at 2 days No growth at 2 days     Results from last 7 days   Lab Units 02/07/22  1123   COVID19  Detected*       Test Results Pending at Discharge     Pending Labs     Order Current Status    Anticardiolipin Antibody, IgG / M, Qn In process    Antithrombin III In process    Beta-2 Glycoprotein Antibodies In process    Factor 2 Activity In process    Lupus Anticoagulant In process    Blood Culture - Blood, Arm, Left Preliminary result    Blood Culture - Blood, Arm, Left Preliminary result          Discharge Details        Discharge Medications      New Medications      Instructions Start Date   apixaban 5 MG tablet tablet  Commonly known as: ELIQUIS   10 mg, Oral, Every 12 Hours Scheduled      apixaban 5 MG tablet tablet  Commonly known as: ELIQUIS   5 mg, Oral, Every 12 Hours Scheduled   Start Date: February 16, 2022     dexamethasone 6 MG tablet  Commonly known as: DECADRON   6 mg, Oral, Daily With Breakfast   Start Date: February 11, 2022     polyethylene glycol 17 GM/SCOOP  powder  Commonly known as: MiraLax   17 g, Oral, 2 Times Daily      senna 8.6 MG tablet  Commonly known as: SENOKOT   2 tablets, Oral, 2 Times Daily         Continue These Medications      Instructions Start Date   amLODIPine 10 MG tablet  Commonly known as: NORVASC   10 mg, Oral, Daily      pantoprazole 40 MG EC tablet  Commonly known as: PROTONIX   40 mg, Oral, 2 Times Daily Before Meals      vitamin D 1.25 MG (93655 UT) capsule capsule  Commonly known as: ERGOCALCIFEROL   50,000 Units, Oral, Weekly, PT TAKES IT EVERY FRIDAY              No Known Allergies    Discharge Disposition:  Home or Self Care      Discharge Diet:  Diet Order   Procedures   • Diet Regular; Cardiac       Discharge Activity:   as tolerated    CODE STATUS:    Code Status and Medical Interventions:   Ordered at: 02/07/22 1738     Code Status (Patient has no pulse and is not breathing):    CPR (Attempt to Resuscitate)     Medical Interventions (Patient has pulse or is breathing):    Full       Future Appointments   Date Time Provider Department Center   3/22/2022 11:50 AM LAB CHAIR 5 [facility]  [facility]   3/22/2022 12:20 PM Noe Graves MD [facility]     Additional Instructions for the Follow-ups that You Need to Schedule     Discharge Follow-up with PCP   As directed       Currently Documented PCP:    Provider, No Known    PCP Phone Number:    [phone]     [facility]         Discharge Follow-up with Specified Provider: Dr. Graves ([facility])   As directed      To: Dr. Graves ([facility])    Follow Up Details: on March 22nd            Follow-up Information     Provider, No Known .    Why: [facility]  Contact information:  [place] [address]  [phone]                         Additional Instructions for the Follow-ups that You Need to Schedule     Discharge Follow-up with PCP   As directed       Currently Documented PCP:    Provider, No Known    PCP Phone  Number:    [phone]     Follow Up Details: [facility]         Discharge Follow-up with Specified Provider: Dr. Graves ([facility])   As directed      To: Dr. Graves ([facility])    Follow Up Details: on March 22nd           Time Spent on Discharge:  Greater than 30 minutes      Faisal Todd MD  [facility]  02/10/22  11:37 EST

## 2022-02-11 ENCOUNTER — READMISSION MANAGEMENT (OUTPATIENT)
Dept: CALL CENTER | Facility: HOSPITAL | Age: 72
End: 2022-02-11

## 2022-02-11 LAB
AT III PPP CHRO-ACNC: 83 % (ref 90–134)
B2 GLYCOPROT1 IGA SER-ACNC: <9 GPI IGA UNITS (ref 0–25)
B2 GLYCOPROT1 IGG SER-ACNC: 26 GPI IGG UNITS (ref 0–20)
B2 GLYCOPROT1 IGM SER-ACNC: 9 GPI IGM UNITS (ref 0–32)

## 2022-02-11 NOTE — OUTREACH NOTE
COVID-19 Week 1 Survey      Responses   [facility] patient discharged from? [place]   Does the patient have one of the following disease processes/diagnoses(primary or secondary)? COVID-19   COVID-19 underlying condition? None   Call Number Call 1   Week 1 Call successful? Yes   Call start time 0937   Call end time 0945   General alerts for this patient Daughter works at [employer] (non clinical). Please call patient at noon.    Discharge diagnosis PE, PNA, Covid   Person spoke with today (if not patient) and relationship Aky-daughter    Meds reviewed with patient/caregiver? Yes   Is the patient having any side effects they believe may be caused by any medication additions or changes? No   Does the patient have all medications ordered at discharge? Yes   Is the patient taking all medications as directed (includes completed medication regime)? Yes   Comments regarding appointments Appt with hematology is on 3/22/22,  [facility]   Does the patient have a primary care provider?  Yes   Comments regarding PCP [facility]    Does the patient have an appointment with their PCP or specialist within 7 days of discharge? No   What is preventing the patient from scheduling follow up appointments within 7 days of discharge? Haven't had time   Nursing Interventions Advised patient to make appointment   Has the patient kept scheduled appointments due by today? N/A   Psychosocial issues? No   Did the patient receive a copy of their discharge instructions? Yes   Did the patient receive a copy of COVID-19 specific instructions? Yes   Nursing interventions Reviewed instructions with patient   What is the patient's perception of their health status since discharge? Improving   Does the patient have any of the following symptoms? Cough   Nursing Interventions Nurse provided patient education   Pulse Ox monitoring None   Is the patient/caregiver able to teach back steps to recovery at home? Set small,  achievable goals for return to baseline health,  Rest and rebuild strength, gradually increase activity   If the patient is a current smoker, are they able to teach back resources for cessation? Not a smoker   Is the patient/caregiver able to teach back the hierarchy of who to call/visit for symptoms/problems? PCP, Specialist, Home health nurse, Urgent Care, ED, 911 Yes   COVID-19 call completed? Yes          Ginger Haines RN

## 2022-02-11 NOTE — OUTREACH NOTE
Prep Survey      Responses   [facility] patient discharged from? [place]   Is LACE score < 7 ? Yes   Emergency Room discharge w/ pulse ox? No   Eligibility Readm Mgmt   Discharge diagnosis PE, PNA, Covid   Does the patient have one of the following disease processes/diagnoses(primary or secondary)? COVID-19   Does the patient have Home health ordered? No   Is there a DME ordered? No   Medication alerts for this patient Meds to Beds   Prep survey completed? Yes          Becky Awan RN

## 2022-02-12 ENCOUNTER — READMISSION MANAGEMENT (OUTPATIENT)
Dept: CALL CENTER | Facility: HOSPITAL | Age: 72
End: 2022-02-12

## 2022-02-12 LAB
BACTERIA SPEC AEROBE CULT: NORMAL
BACTERIA SPEC AEROBE CULT: NORMAL

## 2022-02-12 NOTE — OUTREACH NOTE
COVID-19 Week 1 Survey      Responses   [facility] patient discharged from? [place]   Does the patient have one of the following disease processes/diagnoses(primary or secondary)? COVID-19   COVID-19 underlying condition? None   Call Number Call 2   Week 1 Call successful? Yes   Call start time 1212   Call end time 1214   General alerts for this patient Daughter works at [employer] (non clinical). Please call patient at noon.    Discharge diagnosis PE, PNA, Covid   Comments regarding PCP PCP follow up 2/14/22   Has home health visited the patient within 72 hours of discharge? N/A   Psychosocial issues? No   Nursing interventions Reviewed instructions with patient   What is the patient's perception of their health status since discharge? Improving   Does the patient have any of the following symptoms? Cough   Nursing Interventions Nurse provided patient education   Pulse Ox monitoring None   Is the patient/caregiver able to teach back steps to recovery at home? Set small, achievable goals for return to baseline health,  Rest and rebuild strength, gradually increase activity,  Eat a well-balance diet   If the patient is a current smoker, are they able to teach back resources for cessation? Not a smoker   Is the patient/caregiver able to teach back the hierarchy of who to call/visit for symptoms/problems? PCP, Specialist, Home health nurse, Urgent Care, ED, 911 Yes   COVID-19 call completed? Yes          Roseann Urias RN

## 2022-02-13 ENCOUNTER — READMISSION MANAGEMENT (OUTPATIENT)
Dept: CALL CENTER | Facility: HOSPITAL | Age: 72
End: 2022-02-13

## 2022-02-13 NOTE — OUTREACH NOTE
COVID-19 Week 1 Survey      Responses   [facility] patient discharged from? [place]   Does the patient have one of the following disease processes/diagnoses(primary or secondary)? COVID-19   COVID-19 underlying condition? None   Call Number Call 3   Week 1 Call successful? Yes   Call start time 1117   Call end time 1119   Discharge diagnosis PE, PNA, Covid   What is the patient's perception of their health status since discharge? Improving   Does the patient have any of the following symptoms? Cough   Pulse Ox monitoring None   COVID-19 call completed? Yes   Wrap up additional comments Improving, occasional cough.          Pastora Daniel, RN

## 2022-02-14 LAB
APTT SCREEN TO CONFIRM RATIO: 0.92 RATIO (ref 0–1.34)
CONFIRM APTT/NORMAL: 50.5 SEC (ref 0–47.6)
DRVVT SCREEN TO CONFIRM RATIO: >1 RATIO (ref 0.8–1.2)
LA 2 SCREEN W REFLEX-IMP: ABNORMAL
MIXING DRVVT: 46.6 SEC (ref 0–40.4)
SCREEN APTT: 48.2 SEC (ref 0–51.9)
SCREEN DRVVT: >180 SEC (ref 0–47)
THROMBIN TIME: >150 SEC (ref 0–23)
TT IMM NP PPP: >150 SEC (ref 0–23)
TT P HPASE PPP: 20.6 SEC (ref 0–23)

## 2022-02-14 NOTE — CASE MANAGEMENT/SOCIAL WORK
Case Management Discharge Note      Final Note: Discharge home with family support -DRK    Provided Post Acute Provider List?: Refused  Provided Post Acute Provider Quality & Resource List?: Refused    Selected Continued Care - Discharged on 2/10/2022 Admission date: 2/7/2022 - Discharge disposition: Home or Self Care    Destination    No services have been selected for the patient.              Durable Medical Equipment    No services have been selected for the patient.              Dialysis/Infusion    No services have been selected for the patient.              Home Medical Care    No services have been selected for the patient.              Therapy    No services have been selected for the patient.              Community Resources    No services have been selected for the patient.              Community & DME    No services have been selected for the patient.                  Transportation Services  Private: Car    Final Discharge Disposition Code: 01 - home or self-care

## 2022-02-16 ENCOUNTER — READMISSION MANAGEMENT (OUTPATIENT)
Dept: CALL CENTER | Facility: HOSPITAL | Age: 72
End: 2022-02-16

## 2022-02-16 NOTE — OUTREACH NOTE
COVID-19 Week 2 Survey      Responses   [facility] patient discharged from? [place]   Does the patient have one of the following disease processes/diagnoses(primary or secondary)? COVID-19   COVID-19 underlying condition? None   Call Number Call 1   COVID-19 Week 2: Call 1 attempt successful? Yes   Call start time 1249   Call end time 1255   Meds reviewed with patient/caregiver? Yes   Is the patient having any side effects they believe may be caused by any medication additions or changes? No   Does the patient have all medications ordered at discharge? Yes   Is the patient taking all medications as directed (includes completed medication regime)? Yes   Does the patient have a primary care provider?  Yes   Has the patient kept scheduled appointments due by today? N/A   Comments States [facility] due to covid-next PCP APPT [facility]/24/22 at [facility].   What is the patient's perception of their health status since discharge? Improving   Does the patient have any of the following symptoms? None   Nursing Interventions Nurse provided patient education   Pulse Ox monitoring None   Is the patient/caregiver able to teach back the hierarchy of who to call/visit for symptoms/problems? PCP, Specialist, Home health nurse, Urgent Care, ED, 911 Yes   COVID-19 call completed? Yes   Wrap up additional comments States is improving-states cough has resolved. Denies any needs today.          Rashida Sharma RN

## 2022-03-22 ENCOUNTER — APPOINTMENT (OUTPATIENT)
Dept: LAB | Facility: HOSPITAL | Age: 72
End: 2022-03-22

## 2024-08-02 ENCOUNTER — APPOINTMENT (OUTPATIENT)
Dept: GENERAL RADIOLOGY | Facility: HOSPITAL | Age: 74
End: 2024-08-02
Payer: OTHER GOVERNMENT

## 2024-08-02 ENCOUNTER — HOSPITAL ENCOUNTER (OUTPATIENT)
Facility: HOSPITAL | Age: 74
Setting detail: OBSERVATION
Discharge: HOME | End: 2024-08-03
Attending: EMERGENCY MEDICINE | Admitting: HOSPITALIST
Payer: OTHER GOVERNMENT

## 2024-08-02 DIAGNOSIS — K92.0 COFFEE GROUND EMESIS: Primary | ICD-10-CM

## 2024-08-02 DIAGNOSIS — R11.10 VOMITING, UNSPECIFIED VOMITING TYPE, UNSPECIFIED WHETHER NAUSEA PRESENT: ICD-10-CM

## 2024-08-02 DIAGNOSIS — N28.9 ACUTE RENAL INSUFFICIENCY: ICD-10-CM

## 2024-08-02 DIAGNOSIS — Z87.19 HISTORY OF GASTROESOPHAGEAL REFLUX (GERD): ICD-10-CM

## 2024-08-02 DIAGNOSIS — Z79.01 CHRONIC ANTICOAGULATION: ICD-10-CM

## 2024-08-02 LAB
ALBUMIN SERPL-MCNC: 3.8 G/DL (ref 3.5–5.2)
ALBUMIN/GLOB SERPL: 1.1 G/DL
ALP SERPL-CCNC: 62 U/L (ref 39–117)
ALT SERPL W P-5'-P-CCNC: 14 U/L (ref 1–41)
ANION GAP SERPL CALCULATED.3IONS-SCNC: 10 MMOL/L (ref 5–15)
AST SERPL-CCNC: 18 U/L (ref 1–40)
BASOPHILS # BLD AUTO: 0.01 10*3/MM3 (ref 0–0.2)
BASOPHILS NFR BLD AUTO: 0.1 % (ref 0–1.5)
BILIRUB SERPL-MCNC: 1 MG/DL (ref 0–1.2)
BUN SERPL-MCNC: 14 MG/DL (ref 8–23)
BUN/CREAT SERPL: 10.9 (ref 7–25)
CALCIUM SPEC-SCNC: 10.3 MG/DL (ref 8.6–10.5)
CHLORIDE SERPL-SCNC: 104 MMOL/L (ref 98–107)
CO2 SERPL-SCNC: 24 MMOL/L (ref 22–29)
CREAT SERPL-MCNC: 1.29 MG/DL (ref 0.76–1.27)
DEPRECATED RDW RBC AUTO: 43 FL (ref 37–54)
EGFRCR SERPLBLD CKD-EPI 2021: 57.5 ML/MIN/1.73
EOSINOPHIL # BLD AUTO: 0.17 10*3/MM3 (ref 0–0.4)
EOSINOPHIL NFR BLD AUTO: 2.5 % (ref 0.3–6.2)
ERYTHROCYTE [DISTWIDTH] IN BLOOD BY AUTOMATED COUNT: 13.8 % (ref 12.3–15.4)
GLOBULIN UR ELPH-MCNC: 3.4 GM/DL
GLUCOSE SERPL-MCNC: 109 MG/DL (ref 65–99)
HCT VFR BLD AUTO: 42 % (ref 37.5–51)
HCT VFR BLD AUTO: 43.1 % (ref 37.5–51)
HGB BLD-MCNC: 14.2 G/DL (ref 13–17.7)
HGB BLD-MCNC: 14.5 G/DL (ref 13–17.7)
HOLD SPECIMEN: NORMAL
HOLD SPECIMEN: NORMAL
IMM GRANULOCYTES # BLD AUTO: 0.02 10*3/MM3 (ref 0–0.05)
IMM GRANULOCYTES NFR BLD AUTO: 0.3 % (ref 0–0.5)
LIPASE SERPL-CCNC: 31 U/L (ref 13–60)
LYMPHOCYTES # BLD AUTO: 0.7 10*3/MM3 (ref 0.7–3.1)
LYMPHOCYTES NFR BLD AUTO: 10.5 % (ref 19.6–45.3)
MCH RBC QN AUTO: 29.1 PG (ref 26.6–33)
MCHC RBC AUTO-ENTMCNC: 33.6 G/DL (ref 31.5–35.7)
MCV RBC AUTO: 86.5 FL (ref 79–97)
MONOCYTES # BLD AUTO: 0.39 10*3/MM3 (ref 0.1–0.9)
MONOCYTES NFR BLD AUTO: 5.8 % (ref 5–12)
NEUTROPHILS NFR BLD AUTO: 5.39 10*3/MM3 (ref 1.7–7)
NEUTROPHILS NFR BLD AUTO: 80.8 % (ref 42.7–76)
NRBC BLD AUTO-RTO: 0 /100 WBC (ref 0–0.2)
NT-PROBNP SERPL-MCNC: 520 PG/ML (ref 0–1800)
PLATELET # BLD AUTO: 221 10*3/MM3 (ref 140–450)
PMV BLD AUTO: 11.4 FL (ref 6–12)
POTASSIUM SERPL-SCNC: 3.9 MMOL/L (ref 3.5–5.2)
PROT SERPL-MCNC: 7.2 G/DL (ref 6–8.5)
QT INTERVAL: 395 MS
QTC INTERVAL: 423 MS
RBC # BLD AUTO: 4.98 10*6/MM3 (ref 4.14–5.8)
SODIUM SERPL-SCNC: 138 MMOL/L (ref 136–145)
TROPONIN T SERPL HS-MCNC: 10 NG/L
WBC NRBC COR # BLD AUTO: 6.68 10*3/MM3 (ref 3.4–10.8)
WHOLE BLOOD HOLD COAG: NORMAL
WHOLE BLOOD HOLD SPECIMEN: NORMAL

## 2024-08-02 PROCEDURE — G0378 HOSPITAL OBSERVATION PER HR: HCPCS

## 2024-08-02 PROCEDURE — 71045 X-RAY EXAM CHEST 1 VIEW: CPT

## 2024-08-02 PROCEDURE — 93005 ELECTROCARDIOGRAM TRACING: CPT | Performed by: EMERGENCY MEDICINE

## 2024-08-02 PROCEDURE — 96366 THER/PROPH/DIAG IV INF ADDON: CPT

## 2024-08-02 PROCEDURE — 84484 ASSAY OF TROPONIN QUANT: CPT | Performed by: EMERGENCY MEDICINE

## 2024-08-02 PROCEDURE — 83880 ASSAY OF NATRIURETIC PEPTIDE: CPT | Performed by: EMERGENCY MEDICINE

## 2024-08-02 PROCEDURE — 36415 COLL VENOUS BLD VENIPUNCTURE: CPT

## 2024-08-02 PROCEDURE — 85025 COMPLETE CBC W/AUTO DIFF WBC: CPT | Performed by: EMERGENCY MEDICINE

## 2024-08-02 PROCEDURE — 25810000003 SODIUM CHLORIDE 0.9 % SOLUTION: Performed by: HOSPITALIST

## 2024-08-02 PROCEDURE — 99285 EMERGENCY DEPT VISIT HI MDM: CPT

## 2024-08-02 PROCEDURE — 25810000003 SODIUM CHLORIDE 0.9 % SOLUTION: Performed by: PHYSICIAN ASSISTANT

## 2024-08-02 PROCEDURE — 85018 HEMOGLOBIN: CPT | Performed by: HOSPITALIST

## 2024-08-02 PROCEDURE — 80053 COMPREHEN METABOLIC PANEL: CPT | Performed by: EMERGENCY MEDICINE

## 2024-08-02 PROCEDURE — 93005 ELECTROCARDIOGRAM TRACING: CPT

## 2024-08-02 PROCEDURE — 96376 TX/PRO/DX INJ SAME DRUG ADON: CPT

## 2024-08-02 PROCEDURE — 85014 HEMATOCRIT: CPT | Performed by: HOSPITALIST

## 2024-08-02 PROCEDURE — 83690 ASSAY OF LIPASE: CPT | Performed by: PHYSICIAN ASSISTANT

## 2024-08-02 PROCEDURE — 96365 THER/PROPH/DIAG IV INF INIT: CPT

## 2024-08-02 RX ORDER — SODIUM CHLORIDE 0.9 % (FLUSH) 0.9 %
10 SYRINGE (ML) INJECTION EVERY 12 HOURS SCHEDULED
Status: DISCONTINUED | OUTPATIENT
Start: 2024-08-02 | End: 2024-08-03 | Stop reason: HOSPADM

## 2024-08-02 RX ORDER — PANTOPRAZOLE SODIUM 40 MG/10ML
80 INJECTION, POWDER, LYOPHILIZED, FOR SOLUTION INTRAVENOUS ONCE
Status: COMPLETED | OUTPATIENT
Start: 2024-08-02 | End: 2024-08-02

## 2024-08-02 RX ORDER — ALUMINA, MAGNESIA, AND SIMETHICONE 2400; 2400; 240 MG/30ML; MG/30ML; MG/30ML
15 SUSPENSION ORAL ONCE
Status: COMPLETED | OUTPATIENT
Start: 2024-08-02 | End: 2024-08-02

## 2024-08-02 RX ORDER — ACETAMINOPHEN 500 MG
500 TABLET ORAL EVERY 6 HOURS PRN
COMMUNITY

## 2024-08-02 RX ORDER — ACETAMINOPHEN 160 MG/5ML
650 SOLUTION ORAL EVERY 4 HOURS PRN
Status: DISCONTINUED | OUTPATIENT
Start: 2024-08-02 | End: 2024-08-03 | Stop reason: HOSPADM

## 2024-08-02 RX ORDER — ONDANSETRON 2 MG/ML
4 INJECTION INTRAMUSCULAR; INTRAVENOUS EVERY 6 HOURS PRN
Status: DISCONTINUED | OUTPATIENT
Start: 2024-08-02 | End: 2024-08-03 | Stop reason: HOSPADM

## 2024-08-02 RX ORDER — BISACODYL 5 MG/1
5 TABLET, DELAYED RELEASE ORAL DAILY PRN
Status: DISCONTINUED | OUTPATIENT
Start: 2024-08-02 | End: 2024-08-03 | Stop reason: HOSPADM

## 2024-08-02 RX ORDER — SODIUM CHLORIDE 9 MG/ML
40 INJECTION, SOLUTION INTRAVENOUS AS NEEDED
Status: DISCONTINUED | OUTPATIENT
Start: 2024-08-02 | End: 2024-08-03 | Stop reason: HOSPADM

## 2024-08-02 RX ORDER — FLUTICASONE PROPIONATE 50 MCG
2 SPRAY, SUSPENSION (ML) NASAL DAILY PRN
Status: DISCONTINUED | OUTPATIENT
Start: 2024-08-02 | End: 2024-08-03 | Stop reason: HOSPADM

## 2024-08-02 RX ORDER — LIDOCAINE HYDROCHLORIDE 20 MG/ML
10 SOLUTION OROPHARYNGEAL ONCE
Status: COMPLETED | OUTPATIENT
Start: 2024-08-02 | End: 2024-08-02

## 2024-08-02 RX ORDER — SODIUM CHLORIDE 0.9 % (FLUSH) 0.9 %
10 SYRINGE (ML) INJECTION AS NEEDED
Status: DISCONTINUED | OUTPATIENT
Start: 2024-08-02 | End: 2024-08-03 | Stop reason: HOSPADM

## 2024-08-02 RX ORDER — NITROGLYCERIN 0.4 MG/1
0.4 TABLET SUBLINGUAL
Status: DISCONTINUED | OUTPATIENT
Start: 2024-08-02 | End: 2024-08-03 | Stop reason: HOSPADM

## 2024-08-02 RX ORDER — SENNOSIDES A AND B 8.6 MG/1
2 TABLET, FILM COATED ORAL 2 TIMES DAILY
Status: DISCONTINUED | OUTPATIENT
Start: 2024-08-02 | End: 2024-08-03 | Stop reason: HOSPADM

## 2024-08-02 RX ORDER — SODIUM CHLORIDE 9 MG/ML
100 INJECTION, SOLUTION INTRAVENOUS CONTINUOUS
Status: DISCONTINUED | OUTPATIENT
Start: 2024-08-02 | End: 2024-08-03 | Stop reason: HOSPADM

## 2024-08-02 RX ORDER — ACETAMINOPHEN 650 MG/1
650 SUPPOSITORY RECTAL EVERY 4 HOURS PRN
Status: DISCONTINUED | OUTPATIENT
Start: 2024-08-02 | End: 2024-08-03 | Stop reason: HOSPADM

## 2024-08-02 RX ORDER — AMOXICILLIN 250 MG
2 CAPSULE ORAL 2 TIMES DAILY PRN
Status: DISCONTINUED | OUTPATIENT
Start: 2024-08-02 | End: 2024-08-03 | Stop reason: HOSPADM

## 2024-08-02 RX ORDER — AMLODIPINE BESYLATE 10 MG/1
10 TABLET ORAL DAILY
Status: DISCONTINUED | OUTPATIENT
Start: 2024-08-02 | End: 2024-08-03 | Stop reason: HOSPADM

## 2024-08-02 RX ORDER — ACETAMINOPHEN 325 MG/1
650 TABLET ORAL EVERY 4 HOURS PRN
Status: DISCONTINUED | OUTPATIENT
Start: 2024-08-02 | End: 2024-08-03 | Stop reason: HOSPADM

## 2024-08-02 RX ORDER — POLYETHYLENE GLYCOL 3350 17 G/17G
17 POWDER, FOR SOLUTION ORAL DAILY PRN
Status: DISCONTINUED | OUTPATIENT
Start: 2024-08-02 | End: 2024-08-03 | Stop reason: HOSPADM

## 2024-08-02 RX ORDER — FLUTICASONE PROPIONATE 50 MCG
2 SPRAY, SUSPENSION (ML) NASAL DAILY PRN
COMMUNITY

## 2024-08-02 RX ORDER — POLYETHYLENE GLYCOL 3350 17 G/17G
17 POWDER, FOR SOLUTION ORAL 2 TIMES DAILY
Status: DISCONTINUED | OUTPATIENT
Start: 2024-08-02 | End: 2024-08-03 | Stop reason: HOSPADM

## 2024-08-02 RX ORDER — ONDANSETRON 4 MG/1
4 TABLET, ORALLY DISINTEGRATING ORAL EVERY 6 HOURS PRN
Status: DISCONTINUED | OUTPATIENT
Start: 2024-08-02 | End: 2024-08-03 | Stop reason: HOSPADM

## 2024-08-02 RX ORDER — BISACODYL 10 MG
10 SUPPOSITORY, RECTAL RECTAL DAILY PRN
Status: DISCONTINUED | OUTPATIENT
Start: 2024-08-02 | End: 2024-08-03 | Stop reason: HOSPADM

## 2024-08-02 RX ADMIN — PANTOPRAZOLE SODIUM 8 MG/HR: 40 INJECTION, POWDER, FOR SOLUTION INTRAVENOUS at 21:25

## 2024-08-02 RX ADMIN — POLYETHYLENE GLYCOL 3350 17 G: 17 POWDER, FOR SOLUTION ORAL at 21:00

## 2024-08-02 RX ADMIN — SODIUM CHLORIDE 100 ML/HR: 9 INJECTION, SOLUTION INTRAVENOUS at 16:31

## 2024-08-02 RX ADMIN — PANTOPRAZOLE SODIUM 8 MG/HR: 40 INJECTION, POWDER, FOR SOLUTION INTRAVENOUS at 16:31

## 2024-08-02 RX ADMIN — PANTOPRAZOLE SODIUM 8 MG/HR: 40 INJECTION, POWDER, FOR SOLUTION INTRAVENOUS at 12:02

## 2024-08-02 RX ADMIN — Medication 10 ML: at 21:01

## 2024-08-02 RX ADMIN — SODIUM CHLORIDE 500 ML: 9 INJECTION, SOLUTION INTRAVENOUS at 12:04

## 2024-08-02 RX ADMIN — LIDOCAINE HYDROCHLORIDE 10 ML: 20 SOLUTION ORAL at 11:34

## 2024-08-02 RX ADMIN — ALUMINUM HYDROXIDE, MAGNESIUM HYDROXIDE, DIMETHICONE 15 ML: 400; 400; 40 SUSPENSION ORAL at 11:34

## 2024-08-02 RX ADMIN — PANTOPRAZOLE SODIUM 80 MG: 40 INJECTION, POWDER, FOR SOLUTION INTRAVENOUS at 11:59

## 2024-08-02 RX ADMIN — AMLODIPINE BESYLATE 10 MG: 10 TABLET ORAL at 16:59

## 2024-08-02 RX ADMIN — SENNOSIDES 2 TABLET: 8.6 TABLET, FILM COATED ORAL at 21:00

## 2024-08-02 NOTE — H&P
HISTORY AND PHYSICAL   [facility]        Patient Identification:  Name: David Nugent  Age: 74 y.o.  Sex: male  :  1950  MRN: [id number]                     Primary Care Physician: Provider, No Known    Chief Complaint: Vomiting with coffee-ground emesis.    History of Present Illness:   Pleasant 74-year-old gentleman he normally gets his medical care at the .  He has a history of chronic vomiting.  He states she has had several operative procedures to widen opening to his stomach.  His wife is at bedside and states that he was being treated for achalasia.  With this he has had persistent severe reflux as well as vomiting.  Frequently no nausea associated with this.  He was vomiting again last night and early this morning but early this morning he noted that instead of undigested food there was coffee ground type material present.  He has a history of chronic constipation and there has been no changes in his bowel habits.  No fever sweats or chills.  The last episode of vomiting was this morning prior to arrival to the emergency room.    Past Medical History:  Past Medical History:   Diagnosis Date    Constipation     GERD (gastroesophageal reflux disease)     History of pulmonary embolus (PE)     Hypertension      Past Surgical History:  History reviewed. No pertinent surgical history.   Home Meds:  Medications Prior to Admission   Medication Sig Dispense Refill Last Dose    amLODIPine (NORVASC) 10 MG tablet Take 10 mg by mouth Daily.       apixaban (ELIQUIS) 5 MG tablet tablet Take 1 tablet by mouth Every 12 (Twelve) Hours. Indications: DVT/PE (active thrombosis) 60 tablet 0     pantoprazole (PROTONIX) 40 MG EC tablet Take 40 mg by mouth 2 (Two) Times a Day Before Meals.       polyethylene glycol (MiraLax) 17 GM/SCOOP powder Take 17 g by mouth 2 (Two) Times a Day. 850 g 0     senna (SENOKOT) 8.6 MG tablet Take 2 tablets by mouth 2 (Two) Times a Day. 40 tablet 0     vitamin D  (ERGOCALCIFEROL) 1.25 MG (93893 UT) capsule capsule Take 50,000 Units by mouth 1 (One) Time Per Week. PT TAKES IT EVERY FRIDAY          Allergies:  No Known Allergies  Immunizations:    There is no immunization history on file for this patient.  Social History:   Social History     Social History Narrative    Not on file     Social History     Tobacco Use    Smoking status: Never    Smokeless tobacco: Never   Substance Use Topics    Alcohol use: Never     Family History:  History reviewed. No pertinent family history.     Review of Systems  Review of Systems   Constitutional: Negative.    HENT: Negative.     Eyes: Negative.    Respiratory: Negative.     Cardiovascular: Negative.    Gastrointestinal:         As per history of present illness.   Endocrine: Negative.    Genitourinary: Negative.    Musculoskeletal: Negative.    Skin: Negative.    Allergic/Immunologic: Negative.    Neurological: Negative.    Hematological: Negative.    Psychiatric/Behavioral: Negative.         Objective:  T Max 24 hrs: Temp (24hrs), Av.5 °F (36.4 °C), Min:97.5 °F (36.4 °C), Max:97.5 °F (36.4 °C)    Vitals Ranges:   Temp:  [97.5 °F (36.4 °C)] 97.5 °F (36.4 °C)  Heart Rate:  [67-94] 67  Resp:  [18] 18  BP: ()/(62-92) 162/88      Exam:  Physical Exam  Constitutional:       General: He is not in acute distress.     Appearance: Normal appearance. He is normal weight. He is not ill-appearing, toxic-appearing or diaphoretic.   HENT:      Head: Normocephalic and atraumatic.      Right Ear: External ear normal.      Left Ear: External ear normal.      Nose: Nose normal.      Mouth/Throat:      Mouth: Mucous membranes are moist.   Eyes:      General: No scleral icterus.        Right eye: No discharge.         Left eye: No discharge.      Extraocular Movements: Extraocular movements intact.      Conjunctiva/sclera: Conjunctivae normal.   Cardiovascular:      Rate and Rhythm: Normal rate and regular rhythm.      Heart sounds:      No  friction rub. No gallop.   Pulmonary:      Effort: Pulmonary effort is normal.      Breath sounds: Normal breath sounds.   Abdominal:      General: Abdomen is flat. Bowel sounds are normal. There is no distension.      Palpations: Abdomen is soft. There is no mass.      Tenderness: There is no abdominal tenderness. There is no guarding or rebound.   Musculoskeletal:      Cervical back: Neck supple.      Right lower leg: No edema.      Left lower leg: No edema.   Skin:     General: Skin is warm and dry.   Neurological:      General: No focal deficit present.      Mental Status: He is alert and oriented to person, place, and time.   Psychiatric:         Mood and Affect: Mood normal.         Behavior: Behavior normal.         Thought Content: Thought content normal.         Judgment: Judgment normal.         Data Review:  All labs and radiology reviewed.    Assessment:  Coffee-ground emesis: Continue with PPI drip.  Monitor stools and hemoglobin closely.  GI consultation.  Chronic vomiting: From available history this appears to be severe reflux secondary to achalasia versus possible attempts to correct achalasia.  GI consultation.  Continue PPI.  Elevate head of bed.  Symptomatic management.  Upright after meals.  Chronic anticoagulation: Per available history he had a DVT with pulmonary emboli in February 2022.  Uncertain as to the ongoing anticoagulation.  [facility]  For the time being hold Eliquis.  Elevated creatinine: Last available creatinine however was over 4 years ago.  Ensure adequate hydration.  More recent lab results and [facility] would be helpful.  Hypertension: Continue home meds.  Monitor.  Chronic constipation: Continue daily MiraLAX.  Bowel regime.  History of DVT/PE: About 2 and half years ago.  See above.  SCD for the time being and monitor closely.      Plan:  Please see above.  Discussed with patient and wife at bedside as well as with the family  members.  Discussed with RN.  Discussed with ER provider.    Akshat De Luna MD  8/2/2024  15:12 EDT    EMR Dragon/Transcription disclaimer:   Much of this encounter note is an electronic transcription/translation of spoken language to printed text. The electronic translation of spoken language may permit erroneous, or at times, nonsensical words or phrases to be inadvertently transcribed; Although I have reviewed the note for such errors, some may still exist.

## 2024-08-02 NOTE — ED PROVIDER NOTES
EMERGENCY DEPARTMENT ENCOUNTER      Room Number:  S511/1  PCP: Provider, No Known  Independent Historians: Patient/Family  Patient Care Team:  Provider, No Known as PCP - Faisal Fernandez MD as Referring Physician (Hospitalist)       HPI:  Chief Complaint: Hematemesis    A complete HPI/ROS/PMH/PSH/SH/FH are unobtainable due to: None    Chronic or social conditions impacting patient care (Social Determinants of Health): None      Context: David Nugent is a 74 y.o. male with a PMH significant for hypertension, GERD, DVT, chronic constipation, anticoagulation on Eliquis who presents to the ED c/o acute hematemesis.  Patient and family report that the patient has had persistent nausea and vomiting for the past 4 to 5 months and has been scheduled for an EGD at the [facility] for further evaluation.  He started noticing dark vomit last night and when he persisted with dark vomit this morning he came in for evaluation.  He continues with persistent nausea and some epigastric discomfort.  Denies fever, chills, shortness of breath.    No pertinent records in the accessible EMR      PAST MEDICAL HISTORY  Active Ambulatory Problems     Diagnosis Date Noted    Multiple subsegmental pulmonary emboli without acute cor pulmonale 02/07/2022    Pneumonia due to COVID-19 virus 02/08/2022    HTN (hypertension) 02/08/2022    GERD without esophagitis 02/08/2022    Acute deep vein thrombosis of calf, left 02/08/2022    Chronic constipation 02/09/2022    Nausea & vomiting 02/09/2022     Resolved Ambulatory Problems     Diagnosis Date Noted    No Resolved Ambulatory Problems     Past Medical History:   Diagnosis Date    Constipation     GERD (gastroesophageal reflux disease)     History of pulmonary embolus (PE)     Hypertension          PAST SURGICAL HISTORY  History reviewed. No pertinent surgical history.      FAMILY HISTORY  History reviewed. No pertinent family history.      SOCIAL HISTORY  Social History     Socioeconomic  History    Marital status:    Tobacco Use    Smoking status: Never    Smokeless tobacco: Never   Vaping Use    Vaping status: Never Used   Substance and Sexual Activity    Alcohol use: Never    Drug use: Never    Sexual activity: Defer         ALLERGIES  Patient has no known allergies.      REVIEW OF SYSTEMS  Included in HPI  All systems reviewed and negative except for those discussed in HPI.      PHYSICAL EXAM    I have reviewed the triage vital signs and nursing notes.    ED Triage Vitals   Temp Heart Rate Resp BP SpO2   08/02/24 0936 08/02/24 0936 08/02/24 0936 08/02/24 0957 08/02/24 0936   97.5 °F (36.4 °C) 94 18 90/62 100 %      Temp src Heart Rate Source Patient Position BP Location FiO2 (%)   08/02/24 0936 08/02/24 0936 08/02/24 0957 08/02/24 0957 --   Tympanic Monitor Sitting Right arm        Physical Exam  Constitutional:       General: He is not in acute distress.     Appearance: He is well-developed.   HENT:      Head: Normocephalic and atraumatic.   Eyes:      General: No scleral icterus.     Conjunctiva/sclera: Conjunctivae normal.   Neck:      Trachea: No tracheal deviation.   Cardiovascular:      Rate and Rhythm: Regular rhythm. Tachycardia present.   Pulmonary:      Effort: Pulmonary effort is normal.      Breath sounds: Normal breath sounds.   Abdominal:      Palpations: Abdomen is soft.      Tenderness: There is abdominal tenderness (mild) in the epigastric area. There is no guarding.   Musculoskeletal:         General: No deformity.      Cervical back: Normal range of motion.   Lymphadenopathy:      Cervical: No cervical adenopathy.   Skin:     General: Skin is warm and dry.   Neurological:      Mental Status: He is alert and oriented to person, place, and time.   Psychiatric:         Behavior: Behavior normal.         Vital signs and nursing notes reviewed.      PPE: I wore a surgical mask throughout my encounters with the pt. I performed hand hygiene on entry into the pt room and upon  exit.     LAB RESULTS  Recent Results (from the past 24 hour(s))   Comprehensive Metabolic Panel    Collection Time: 08/02/24 11:33 AM    Specimen: Blood   Result Value Ref Range    Glucose 109 (H) 65 - 99 mg/dL    BUN 14 8 - 23 mg/dL    Creatinine 1.29 (H) 0.76 - 1.27 mg/dL    Sodium 138 136 - 145 mmol/L    Potassium 3.9 3.5 - 5.2 mmol/L    Chloride 104 98 - 107 mmol/L    CO2 24.0 22.0 - 29.0 mmol/L    Calcium 10.3 8.6 - 10.5 mg/dL    Total Protein 7.2 6.0 - 8.5 g/dL    Albumin 3.8 3.5 - 5.2 g/dL    ALT (SGPT) 14 1 - 41 U/L    AST (SGOT) 18 1 - 40 U/L    Alkaline Phosphatase 62 39 - 117 U/L    Total Bilirubin 1.0 0.0 - 1.2 mg/dL    Globulin 3.4 gm/dL    A/G Ratio 1.1 g/dL    BUN/Creatinine Ratio 10.9 7.0 - 25.0    Anion Gap 10.0 5.0 - 15.0 mmol/L    eGFR 57.5 (L) >60.0 mL/min/1.73   BNP    Collection Time: 08/02/24 11:33 AM    Specimen: Blood   Result Value Ref Range    proBNP 520.0 0.0 - 1,800.0 pg/mL   Single High Sensitivity Troponin T    Collection Time: 08/02/24 11:33 AM    Specimen: Blood   Result Value Ref Range    HS Troponin T 10 <22 ng/L   Green Top (Gel)    Collection Time: 08/02/24 11:33 AM   Result Value Ref Range    Extra Tube Hold for add-ons.    Lavender Top    Collection Time: 08/02/24 11:33 AM   Result Value Ref Range    Extra Tube hold for add-on    Gold Top - SST    Collection Time: 08/02/24 11:33 AM   Result Value Ref Range    Extra Tube Hold for add-ons.    Light Blue Top    Collection Time: 08/02/24 11:33 AM   Result Value Ref Range    Extra Tube Hold for add-ons.    CBC Auto Differential    Collection Time: 08/02/24 11:33 AM    Specimen: Blood   Result Value Ref Range    WBC 6.68 3.40 - 10.80 10*3/mm3    RBC 4.98 4.14 - 5.80 10*6/mm3    Hemoglobin 14.5 13.0 - 17.7 g/dL    Hematocrit 43.1 37.5 - 51.0 %    MCV 86.5 79.0 - 97.0 fL    MCH 29.1 26.6 - 33.0 pg    MCHC 33.6 31.5 - 35.7 g/dL    RDW 13.8 12.3 - 15.4 %    RDW-SD 43.0 37.0 - 54.0 fl    MPV 11.4 6.0 - 12.0 fL    Platelets 221 140 - 450  10*3/mm3    Neutrophil % 80.8 (H) 42.7 - 76.0 %    Lymphocyte % 10.5 (L) 19.6 - 45.3 %    Monocyte % 5.8 5.0 - 12.0 %    Eosinophil % 2.5 0.3 - 6.2 %    Basophil % 0.1 0.0 - 1.5 %    Immature Grans % 0.3 0.0 - 0.5 %    Neutrophils, Absolute 5.39 1.70 - 7.00 10*3/mm3    Lymphocytes, Absolute 0.70 0.70 - 3.10 10*3/mm3    Monocytes, Absolute 0.39 0.10 - 0.90 10*3/mm3    Eosinophils, Absolute 0.17 0.00 - 0.40 10*3/mm3    Basophils, Absolute 0.01 0.00 - 0.20 10*3/mm3    Immature Grans, Absolute 0.02 0.00 - 0.05 10*3/mm3    nRBC 0.0 0.0 - 0.2 /100 WBC   Lipase    Collection Time: 08/02/24 11:33 AM    Specimen: Blood   Result Value Ref Range    Lipase 31 13 - 60 U/L   Hemoglobin & Hematocrit, Blood    Collection Time: 08/02/24  4:39 PM    Specimen: Blood   Result Value Ref Range    Hemoglobin 14.2 13.0 - 17.7 g/dL    Hematocrit 42.0 37.5 - 51.0 %   Hemoglobin & Hematocrit, Blood    Collection Time: 08/03/24 12:16 AM    Specimen: Blood   Result Value Ref Range    Hemoglobin 13.2 13.0 - 17.7 g/dL    Hematocrit 40.1 37.5 - 51.0 %   Basic Metabolic Panel    Collection Time: 08/03/24  6:06 AM    Specimen: Blood   Result Value Ref Range    Glucose 80 65 - 99 mg/dL    BUN 12 8 - 23 mg/dL    Creatinine 1.02 0.76 - 1.27 mg/dL    Sodium 140 136 - 145 mmol/L    Potassium 3.8 3.5 - 5.2 mmol/L    Chloride 111 (H) 98 - 107 mmol/L    CO2 22.0 22.0 - 29.0 mmol/L    Calcium 8.9 8.6 - 10.5 mg/dL    BUN/Creatinine Ratio 11.8 7.0 - 25.0    Anion Gap 7.0 5.0 - 15.0 mmol/L    eGFR 76.2 >60.0 mL/min/1.73   Magnesium    Collection Time: 08/03/24  6:06 AM    Specimen: Blood   Result Value Ref Range    Magnesium 2.0 1.6 - 2.4 mg/dL   Hemoglobin & Hematocrit, Blood    Collection Time: 08/03/24  6:06 AM    Specimen: Blood   Result Value Ref Range    Hemoglobin 12.8 (L) 13.0 - 17.7 g/dL    Hematocrit 37.9 37.5 - 51.0 %         RADIOLOGY  No Radiology Exams Resulted Within Past 24 Hours      MEDICATIONS GIVEN IN ER  Medications   sodium chloride 0.9 %  flush 10 mL (has no administration in time range)   pantoprazole (PROTONIX) 40 mg in sodium chloride 0.9 % 100 mL (0.4 mg/mL) MBP (8 mg/hr Intravenous New Bag 8/3/24 0653)   amLODIPine (NORVASC) tablet 10 mg (10 mg Oral Not Given 8/3/24 0824)   fluticasone (FLONASE) 50 MCG/ACT nasal spray 2 spray (has no administration in time range)   polyethylene glycol (MIRALAX) packet 17 g (17 g Oral Not Given 8/3/24 0824)   senna (SENOKOT) tablet 2 tablet (2 tablets Oral Not Given 8/3/24 0824)   sodium chloride 0.9 % flush 10 mL (10 mL Intravenous Not Given 8/3/24 0824)   sodium chloride 0.9 % flush 10 mL (has no administration in time range)   sodium chloride 0.9 % infusion 40 mL (has no administration in time range)   nitroglycerin (NITROSTAT) SL tablet 0.4 mg (has no administration in time range)   sodium chloride 0.9 % infusion (100 mL/hr Intravenous Rate/Dose Verify 8/3/24 0824)   Potassium Replacement - Follow Nurse / BPA Driven Protocol (has no administration in time range)   Magnesium Standard Dose Replacement - Follow Nurse / BPA Driven Protocol (has no administration in time range)   Phosphorus Replacement - Follow Nurse / BPA Driven Protocol (has no administration in time range)   Calcium Replacement - Follow Nurse / BPA Driven Protocol (has no administration in time range)   acetaminophen (TYLENOL) tablet 650 mg (has no administration in time range)     Or   acetaminophen (TYLENOL) 160 MG/5ML oral solution 650 mg (has no administration in time range)     Or   acetaminophen (TYLENOL) suppository 650 mg (has no administration in time range)   sennosides-docusate (PERICOLACE) 8.6-50 MG per tablet 2 tablet (has no administration in time range)     And   polyethylene glycol (MIRALAX) packet 17 g (has no administration in time range)     And   bisacodyl (DULCOLAX) EC tablet 5 mg (has no administration in time range)     And   bisacodyl (DULCOLAX) suppository 10 mg (has no administration in time range)   ondansetron ODT  (ZOFRAN-ODT) disintegrating tablet 4 mg (has no administration in time range)     Or   ondansetron (ZOFRAN) injection 4 mg (has no administration in time range)   aluminum-magnesium hydroxide-simethicone (MAALOX MAX) 400-400-40 MG/5ML suspension 15 mL (15 mL Oral Given 8/2/24 1134)   Lidocaine Viscous HCl (XYLOCAINE) 2 % solution 10 mL (10 mL Mouth/Throat Given 8/2/24 1134)   pantoprazole (PROTONIX) injection 80 mg (80 mg Intravenous Given 8/2/24 1159)   sodium chloride 0.9 % bolus 500 mL (0 mL Intravenous Stopped 8/2/24 1304)         ORDERS PLACED DURING THIS VISIT:  Orders Placed This Encounter   Procedures    XR Chest 1 View    [place] Draw    Comprehensive Metabolic Panel    BNP    Single High Sensitivity Troponin T    CBC Auto Differential    Lipase    Hemoglobin & Hematocrit, Blood    Basic Metabolic Panel    Magnesium    NPO Diet NPO Type: Strict NPO    Undress & Gown    Continuous Pulse Oximetry    Vital Signs    Vital Signs    Intake & Output    Weigh Patient    Oral Care    Place Sequential Compression Device    Maintain Sequential Compression Device    Maintain IV Access    Telemetry - Place Orders & Notify Provider of Results When Patient Experiences Acute Chest Pain, Dysrhythmia or Respiratory Distress    May Be Off Telemetry for Tests    Ambulate Patient    Up With Assistance    Code Status and Medical Interventions: CPR (Attempt to Resuscitate); Full Support    [facility] (on-call MD unless specified) Details    Inpatient Gastroenterology Consult    PT Consult: Eval & Treat Functional Mobility Below Baseline    Oxygen Therapy- Nasal Cannula; Titrate 1-6 LPM Per SpO2; 90 - 95%    SLP Consult: Eval & Treat RN Dysphagia Screen Failed    ECG 12 Lead ED Triage Standing Order; SOA    Telemetry Scan    Telemetry Scan    Telemetry Scan    Insert Peripheral IV    Insert Peripheral IV    Initiate Observation Status    CBC & Differential    Green Top (Gel)    Lavender Top    Gold Top - SST    Light Blue Top          OUTPATIENT MEDICATION MANAGEMENT:  Current Facility-Administered Medications Ordered in Epic   Medication Dose Route Frequency Provider Last Rate Last Admin    acetaminophen (TYLENOL) tablet 650 mg  650 mg Oral Q4H PRN Akshat De Luna MD        Or    acetaminophen (TYLENOL) 160 MG/5ML oral solution 650 mg  650 mg Oral Q4H PRN Akshat De Luna MD        Or    acetaminophen (TYLENOL) suppository 650 mg  650 mg Rectal Q4H PRN Akshat De Luna MD        amLODIPine (NORVASC) tablet 10 mg  10 mg Oral Daily Akshat De Luna MD   10 mg at 08/02/24 1659    sennosides-docusate (PERICOLACE) 8.6-50 MG per tablet 2 tablet  2 tablet Oral BID PRN Akshat De Luna MD        And    polyethylene glycol (MIRALAX) packet 17 g  17 g Oral Daily PRN Akshat De Luna MD        And    bisacodyl (DULCOLAX) EC tablet 5 mg  5 mg Oral Daily PRN Akshat De Luna MD        And    bisacodyl (DULCOLAX) suppository 10 mg  10 mg Rectal Daily PRN Akshat De Luna MD        Calcium Replacement - Follow Nurse / BPA Driven Protocol   Does not apply PRN Akshat De Luna MD        fluticasone (FLONASE) 50 MCG/ACT nasal spray 2 spray  2 spray Nasal Daily PRN Akshat De Luna MD        Magnesium Standard Dose Replacement - Follow Nurse / BPA Driven Protocol   Does not apply PRN Akshat De Luna MD        nitroglycerin (NITROSTAT) SL tablet 0.4 mg  0.4 mg Sublingual Q5 Min PRN Akshat De Luna MD        ondansetron ODT (ZOFRAN-ODT) disintegrating tablet 4 mg  4 mg Oral Q6H PRN Akshat De Luna MD        Or    ondansetron (ZOFRAN) injection 4 mg  4 mg Intravenous Q6H PRN Akshat De Luna MD        pantoprazole (PROTONIX) 40 mg in sodium chloride 0.9 % 100 mL (0.4 mg/mL) MBP  8 mg/hr Intravenous Continuous Akshat De Luna MD 20 mL/hr at 08/03/24 0653 8 mg/hr at 08/03/24 0653    Phosphorus Replacement - Follow Nurse / BPA Driven Protocol   Does not apply PRN Akshat De Luna MD         polyethylene glycol (MIRALAX) packet 17 g  17 g Oral BID Akshat De Luna MD   17 g at 08/02/24 2100    Potassium Replacement - Follow Nurse / BPA Driven Protocol   Does not apply PRN Akshat De Luna MD        senna (SENOKOT) tablet 2 tablet  2 tablet Oral BID Akshat De Luna MD   2 tablet at 08/02/24 2100    sodium chloride 0.9 % flush 10 mL  10 mL Intravenous PRN Akshat De Luna MD        sodium chloride 0.9 % flush 10 mL  10 mL Intravenous Q12H Akshat De Luna MD   10 mL at 08/02/24 2101    sodium chloride 0.9 % flush 10 mL  10 mL Intravenous PRN Akshat De Luna MD        sodium chloride 0.9 % infusion 40 mL  40 mL Intravenous PRN Akshat De Luna MD        sodium chloride 0.9 % infusion  100 mL/hr Intravenous Continuous Akshat De Luna  mL/hr at 08/03/24 0824 100 mL/hr at 08/03/24 0824     No current [facility]-ordered outpatient medications on file.            PROGRESS, DATA ANALYSIS, CONSULTS, AND MEDICAL DECISION MAKING  All labs have been independently interpreted by me.  All radiology studies have been reviewed by me. All EKG's have been independently viewed and interpreted by me.  Discussion below represents my analysis of pertinent findings related to patient's condition, differential diagnosis, treatment plan and final disposition.      DIFFERENTIAL DIAGNOSIS INCLUDE BUT NOT LIMITED TO:     Differential diagnosis includes but is not limited to:  - Hepatobiliary pathology such as cholecystitis, cholangitis, and symptomatic cholelithiasis  - Pancreatitis  - Dyspepsia  - Small bowel obstruction  - Appendicitis  - Diverticulitis  - UTI including pyelonephritis  - Ureteral stone  - Zoster  - Colitis, including infectious and ischemic  - Atypical ACS    Clinical Scores: N/A      ED Course as of 08/03/24 1049   Fri Aug 02, 2024   1204 Per my independent interpretation of the chest x-ray, there is no obvious pneumothorax. [DC]   1243 WBC: 6.68 [DC-2]   1243 Hemoglobin: 14.5  [DC-2]   1243 Platelets: 221 [DC-2]   1318 HS Troponin T: 10 [DC-2]   1318 proBNP: 520.0 [DC-2]   1318 Lipase: 31 [DC-2]   1318 Glucose(!): 109 [DC-2]   1318 BUN: 14 [DC-2]   1318 Creatinine(!): 1.29  1.05 two years ago [DC-2]   1318 Sodium: 138 [DC-2]   1318 Potassium: 3.9 [DC-2]   1318 ALT (SGPT): 14 [DC-2]   1318 AST (SGOT): 18 [DC-2]   1318 Alkaline Phosphatase: 62 [DC-2]   1318 Total Bilirubin: 1.0 [DC-2]   1352 Discussed with Dr. De Luna, [facility], discussed patient's clinical course and findings today, treatment modalities, and need for hospitalization. [DC-2]      ED Course User Index  [DC] Marciano Willams PA  [DC-2] Franki Escobar MD              AS OF 10:49 EDT VITALS:    BP - 143/86  HR - 71  TEMP - 98.4 °F (36.9 °C) (Oral)  O2 SATS - 99%    COMPLEXITY OF CARE  The patient requires admission.      DIAGNOSIS  Final diagnoses:   Coffee ground emesis   Vomiting, unspecified vomiting type, unspecified whether nausea present   History of gastroesophageal reflux (GERD)   Chronic anticoagulation   Acute renal insufficiency         DISPOSITION  ED Disposition       ED Disposition   Decision to Admit    Condition   --    Comment   Level of Care: Telemetry [5]   Diagnosis: Coffee ground emesis [[id number]]   Admitting Physician: KAREN DE LUNA [[id number]]   Attending Physician: AKREN DE LUNA [[id number]]                  Please note that portions of this document were completed with a voice recognition program.    Note Disclaimer: At [facility], we believe that sharing information builds trust and better relationships. You are receiving this note because you recently visited [facility]. It is possible you will see health information before a provider has talked with you about it. This kind of information can be easy to misunderstand. To help you fully understand what it means for your health, we urge you to discuss this note with your provider.         Marciano Willams PA  08/03/24 1049

## 2024-08-02 NOTE — ED PROVIDER NOTES
Pt presents to the ED c/o coffee-ground emesis started this morning.  Patient has chronic vomiting for several years, has had multiple prior surgeries in the past.  Coffee-ground emesis is new.  No chest pain or shortness of breath.  Mild epigastric discomfort.  History of thromboembolism, has not been on Eliquis for the past 2 days.     On exam,   General: No acute distress, nontoxic  HEENT: Mucous membranes moist, atraumatic, EOMI  Neck: Full ROM  Pulm: Symmetric chest rise, nonlabored, lungs CTAB  Cardiovascular: Regular rate and rhythm, intact distal pulses  GI: Soft, nontender, nondistended, no rebound, no guarding, bowel sounds present  MSK: Full ROM, no deformity  Skin: Warm, dry  Neuro: Awake, alert, oriented x 4, GCS 15, moving all extremities, no focal deficits  Psych: Calm, cooperative          Plan: Plan for labs, chest x-ray, and reevaluation with results with concerns for possible developing bleeding gastritis or peptic ulcer disease.    ED Course as of 08/02/24 1356   Fri Aug 02, 2024   1204 Per my independent interpretation of the chest x-ray, there is no obvious pneumothorax. [DC]   1243 WBC: 6.68 [DC-2]   1243 Hemoglobin: 14.5 [DC-2]   1243 Platelets: 221 [DC-2]   1318 HS Troponin T: 10 [DC-2]   1318 proBNP: 520.0 [DC-2]   1318 Lipase: 31 [DC-2]   1318 Glucose(!): 109 [DC-2]   1318 BUN: 14 [DC-2]   1318 Creatinine(!): 1.29  1.05 two years ago [DC-2]   1318 Sodium: 138 [DC-2]   1318 Potassium: 3.9 [DC-2]   1318 ALT (SGPT): 14 [DC-2]   1318 AST (SGOT): 18 [DC-2]   1318 Alkaline Phosphatase: 62 [DC-2]   1318 Total Bilirubin: 1.0 [DC-2]   1352 Discussed with Dr. De Luna, [facility], discussed patient's clinical course and findings today, treatment modalities, and need for hospitalization. [DC-2]      ED Course User Index  [DC] Marciano Willams PA  [DC-2] Culy, Franki T, MD       Reassuring labs, minimal acute renal insufficiency, plan for IV fluids, Protonix, and likely hospitalization for GI consult.      Diagnosis Plan   1. Coffee ground emesis        2. Vomiting, unspecified vomiting type, unspecified whether nausea present        3. History of gastroesophageal reflux (GERD)        4. Chronic anticoagulation        5. Acute renal insufficiency          HOSPITALIZATION    Discussed treatment plan and reason for hospitalization with pt/family and hospitalizing physician.  Pt/family voiced understanding of the plan for hospitalization for further testing/treatment as needed.        MD Attestation Note    SHARED VISIT: This visit was performed by BOTH a physician and an APC. The substantive portion of the medical decision making was performed by this attesting physician who made or approved the management plan and takes responsibility for patient management. All studies in the APC note (if performed) were independently interpreted by me.                   Franki Escobar MD  08/02/24 8407

## 2024-08-02 NOTE — CASE MANAGEMENT/SOCIAL WORK
"Discharge Planning Assessment  [facility]     Patient Name: David Nugent  MRN: [id number]  Today's Date: 8/2/2024    Admit Date: 8/2/2024        Discharge Needs Assessment    No documentation.                  Discharge Plan       Row Name 08/02/24 1453       Plan    Plan Comments Entered room, introduced self and explained role to patient and family at bedside- noted patient with Medicare A and VA benefits; reviewed MD plan for admission to hospital here or [facility] with patient- patient prefers to stay here at [facility] noting \"the VA should pay for it.\" This CCP advised I am unsure of coverage but will notify [facility] of plan to admit; [facility] to the [facility]- received notification number [phone]- Provided email for folow up with further information. Updated family                  Continued Care and Services - Admitted Since 8/2/2024    No active coordination exists for this encounter.          Demographic Summary    No documentation.                  Functional Status    No documentation.                  Psychosocial    No documentation.                  Abuse/Neglect    No documentation.                  Legal    No documentation.                  Substance Abuse    No documentation.                  Patient Forms    No documentation.                     Cheri Russ RN    "

## 2024-08-02 NOTE — PLAN OF CARE
Goal Outcome Evaluation:        Problem: Adult Inpatient Plan of Care  Goal: Plan of Care Review  Outcome: Ongoing, Progressing  Goal: Patient-Specific Goal (Individualized)  Outcome: Ongoing, Progressing  Goal: Absence of Hospital-Acquired Illness or Injury  Outcome: Ongoing, Progressing  Intervention: Identify and Manage Fall Risk  Recent Flowsheet Documentation  Taken 8/2/2024 1620 by Consuelo Chatman RN  Safety Promotion/Fall Prevention:   activity supervised   safety round/check completed  Intervention: Prevent Skin Injury  Recent Flowsheet Documentation  Taken 8/2/2024 1620 by Consuelo Chatman RN  Body Position: position changed independently  Taken 8/2/2024 1533 by Consuelo Chatman RN  Skin Protection:   adhesive use limited   tubing/devices free from skin contact  Intervention: Prevent and Manage VTE (Venous Thromboembolism) Risk  Recent Flowsheet Documentation  Taken 8/2/2024 1620 by Consuelo Chatman RN  Activity Management: activity encouraged  Taken 8/2/2024 1533 by Consuelo Chatman RN  Range of Motion: active ROM (range of motion) encouraged  Intervention: Prevent Infection  Recent Flowsheet Documentation  Taken 8/2/2024 1620 by Consuelo Chatman RN  Infection Prevention:   environmental surveillance performed   hand hygiene promoted   single patient room provided  Goal: Optimal Comfort and Wellbeing  Outcome: Ongoing, Progressing  Intervention: Provide Person-Centered Care  Recent Flowsheet Documentation  Taken 8/2/2024 1533 by Consuelo Chatman RN  Trust Relationship/Rapport: care explained  Goal: Readiness for Transition of Care  Outcome: Ongoing, Progressing  Intervention: Mutually Develop Transition Plan  Recent Flowsheet Documentation  Taken 8/2/2024 1520 by Consuelo Chatman RN  Transportation Anticipated: family or friend will provide  Patient/Family Anticipated Services at Transition: none  Patient/Family Anticipates Transition to: home with family  Taken 8/2/2024 1518 by Consuelo Chatman RN  Equipment Currently Used at  Home: none     Problem: Adjustment to Illness (Gastrointestinal Bleeding)  Goal: Optimal Coping with Acute Illness  Outcome: Ongoing, Progressing     Problem: Bleeding (Gastrointestinal Bleeding)  Goal: Hemostasis  Outcome: Ongoing, Progressing     Problem: Hypertension Acute  Goal: Blood Pressure Within Desired Range  Outcome: Ongoing, Progressing  Intervention: Normalize Blood Pressure  Recent Flowsheet Documentation  Taken 8/2/2024 1620 by Consuelo Chatman RN  Medication Review/Management: medications reviewed     Problem: Nausea and Vomiting  Goal: Fluid and Electrolyte Balance  Outcome: Ongoing, Progressing     Problem: Fall Injury Risk  Goal: Absence of Fall and Fall-Related Injury  Outcome: Ongoing, Progressing  Intervention: Identify and Manage Contributors  Recent Flowsheet Documentation  Taken 8/2/2024 1620 by Consuelo Chatman RN  Medication Review/Management: medications reviewed  Intervention: Promote Injury-Free Environment  Recent Flowsheet Documentation  Taken 8/2/2024 1620 by Consuelo Chatman RN  Safety Promotion/Fall Prevention:   activity supervised   safety round/check completed     Problem: Pain Acute  Goal: Acceptable Pain Control and Functional Ability  Outcome: Ongoing, Progressing  Intervention: Prevent or Manage Pain  Recent Flowsheet Documentation  Taken 8/2/2024 1620 by Consuelo Chatman RN  Medication Review/Management: medications reviewed  Intervention: Optimize Psychosocial Wellbeing  Recent Flowsheet Documentation  Taken 8/2/2024 1533 by Consuelo Chatman RN  Diversional Activities:   television   smartphone

## 2024-08-03 ENCOUNTER — ANESTHESIA EVENT (OUTPATIENT)
Dept: GASTROENTEROLOGY | Facility: HOSPITAL | Age: 74
End: 2024-08-03
Payer: OTHER GOVERNMENT

## 2024-08-03 ENCOUNTER — ANESTHESIA (OUTPATIENT)
Dept: GASTROENTEROLOGY | Facility: HOSPITAL | Age: 74
End: 2024-08-03
Payer: OTHER GOVERNMENT

## 2024-08-03 VITALS
SYSTOLIC BLOOD PRESSURE: 169 MMHG | BODY MASS INDEX: 26.32 KG/M2 | TEMPERATURE: 97.7 F | OXYGEN SATURATION: 99 % | RESPIRATION RATE: 18 BRPM | DIASTOLIC BLOOD PRESSURE: 87 MMHG | HEART RATE: 67 BPM | WEIGHT: 177.7 LBS | HEIGHT: 69 IN

## 2024-08-03 LAB
ANION GAP SERPL CALCULATED.3IONS-SCNC: 7 MMOL/L (ref 5–15)
BUN SERPL-MCNC: 12 MG/DL (ref 8–23)
BUN/CREAT SERPL: 11.8 (ref 7–25)
CALCIUM SPEC-SCNC: 8.9 MG/DL (ref 8.6–10.5)
CHLORIDE SERPL-SCNC: 111 MMOL/L (ref 98–107)
CO2 SERPL-SCNC: 22 MMOL/L (ref 22–29)
CREAT SERPL-MCNC: 1.02 MG/DL (ref 0.76–1.27)
EGFRCR SERPLBLD CKD-EPI 2021: 76.2 ML/MIN/1.73
GLUCOSE SERPL-MCNC: 80 MG/DL (ref 65–99)
HCT VFR BLD AUTO: 37.9 % (ref 37.5–51)
HCT VFR BLD AUTO: 40.1 % (ref 37.5–51)
HGB BLD-MCNC: 12.8 G/DL (ref 13–17.7)
HGB BLD-MCNC: 13.2 G/DL (ref 13–17.7)
MAGNESIUM SERPL-MCNC: 2 MG/DL (ref 1.6–2.4)
POTASSIUM SERPL-SCNC: 3.8 MMOL/L (ref 3.5–5.2)
SODIUM SERPL-SCNC: 140 MMOL/L (ref 136–145)

## 2024-08-03 PROCEDURE — 97530 THERAPEUTIC ACTIVITIES: CPT

## 2024-08-03 PROCEDURE — 85018 HEMOGLOBIN: CPT | Performed by: HOSPITALIST

## 2024-08-03 PROCEDURE — 25810000003 SODIUM CHLORIDE 0.9 % SOLUTION: Performed by: HOSPITALIST

## 2024-08-03 PROCEDURE — 99222 1ST HOSP IP/OBS MODERATE 55: CPT | Performed by: INTERNAL MEDICINE

## 2024-08-03 PROCEDURE — 63710000001 SUCRALFATE 1 G TABLET: Performed by: INTERNAL MEDICINE

## 2024-08-03 PROCEDURE — 97161 PT EVAL LOW COMPLEX 20 MIN: CPT

## 2024-08-03 PROCEDURE — 96366 THER/PROPH/DIAG IV INF ADDON: CPT

## 2024-08-03 PROCEDURE — 43249 ESOPH EGD DILATION <30 MM: CPT | Performed by: INTERNAL MEDICINE

## 2024-08-03 PROCEDURE — 85014 HEMATOCRIT: CPT | Performed by: HOSPITALIST

## 2024-08-03 PROCEDURE — A9270 NON-COVERED ITEM OR SERVICE: HCPCS | Performed by: INTERNAL MEDICINE

## 2024-08-03 PROCEDURE — 80048 BASIC METABOLIC PNL TOTAL CA: CPT | Performed by: HOSPITALIST

## 2024-08-03 PROCEDURE — G0378 HOSPITAL OBSERVATION PER HR: HCPCS

## 2024-08-03 PROCEDURE — C1726 CATH, BAL DIL, NON-VASCULAR: HCPCS | Performed by: INTERNAL MEDICINE

## 2024-08-03 PROCEDURE — 83735 ASSAY OF MAGNESIUM: CPT | Performed by: HOSPITALIST

## 2024-08-03 PROCEDURE — 25010000002 PROPOFOL 1000 MG/100ML EMULSION: Performed by: ANESTHESIOLOGY

## 2024-08-03 RX ORDER — LIDOCAINE HYDROCHLORIDE 20 MG/ML
INJECTION, SOLUTION INFILTRATION; PERINEURAL AS NEEDED
Status: DISCONTINUED | OUTPATIENT
Start: 2024-08-03 | End: 2024-08-03 | Stop reason: SURG

## 2024-08-03 RX ORDER — PANTOPRAZOLE SODIUM 40 MG/1
40 TABLET, DELAYED RELEASE ORAL
Status: DISCONTINUED | OUTPATIENT
Start: 2024-08-03 | End: 2024-08-03 | Stop reason: HOSPADM

## 2024-08-03 RX ORDER — PROPOFOL 10 MG/ML
INJECTION, EMULSION INTRAVENOUS AS NEEDED
Status: DISCONTINUED | OUTPATIENT
Start: 2024-08-03 | End: 2024-08-03 | Stop reason: SURG

## 2024-08-03 RX ORDER — SUCRALFATE 1 G/1
1 TABLET ORAL
Status: DISCONTINUED | OUTPATIENT
Start: 2024-08-03 | End: 2024-08-03 | Stop reason: HOSPADM

## 2024-08-03 RX ORDER — SUCRALFATE 1 G/1
1 TABLET ORAL
Qty: 90 TABLET | Refills: 0 | Status: SHIPPED | OUTPATIENT
Start: 2024-08-03 | End: 2024-09-02

## 2024-08-03 RX ADMIN — PANTOPRAZOLE SODIUM 8 MG/HR: 40 INJECTION, POWDER, FOR SOLUTION INTRAVENOUS at 06:53

## 2024-08-03 RX ADMIN — SUCRALFATE 1 G: 1 TABLET ORAL at 13:57

## 2024-08-03 RX ADMIN — PROPOFOL INJECTABLE EMULSION 100 MG: 10 INJECTION, EMULSION INTRAVENOUS at 11:56

## 2024-08-03 RX ADMIN — PROPOFOL INJECTABLE EMULSION 50 MG: 10 INJECTION, EMULSION INTRAVENOUS at 11:58

## 2024-08-03 RX ADMIN — PROPOFOL INJECTABLE EMULSION 20 MG: 10 INJECTION, EMULSION INTRAVENOUS at 12:01

## 2024-08-03 RX ADMIN — PROPOFOL INJECTABLE EMULSION 30 MG: 10 INJECTION, EMULSION INTRAVENOUS at 11:59

## 2024-08-03 RX ADMIN — PROPOFOL INJECTABLE EMULSION 50 MG: 10 INJECTION, EMULSION INTRAVENOUS at 12:04

## 2024-08-03 RX ADMIN — SODIUM CHLORIDE 100 ML/HR: 9 INJECTION, SOLUTION INTRAVENOUS at 13:44

## 2024-08-03 RX ADMIN — LIDOCAINE HYDROCHLORIDE 30 MG: 20 INJECTION, SOLUTION INFILTRATION; PERINEURAL at 11:56

## 2024-08-03 RX ADMIN — SODIUM CHLORIDE 100 ML/HR: 9 INJECTION, SOLUTION INTRAVENOUS at 01:24

## 2024-08-03 RX ADMIN — PANTOPRAZOLE SODIUM 8 MG/HR: 40 INJECTION, POWDER, FOR SOLUTION INTRAVENOUS at 01:20

## 2024-08-03 NOTE — ANESTHESIA PREPROCEDURE EVALUATION
Anesthesia Evaluation     NPO Solid Status: > 8 hours             Airway   Mallampati: II  Dental      Pulmonary    (+) pulmonary embolism (hx PE, on Eliquis),  (-) sleep apnea, not a smoker    ROS comment: Negative patient screen for LELE    Cardiovascular     (+) hypertension, DVT resolved      Neuro/Psych  GI/Hepatic/Renal/Endo    (+) GERD, GI bleeding     Musculoskeletal     Abdominal    Substance History      OB/GYN          Other                    Anesthesia Plan    ASA 3 - emergent     MAC       Anesthetic plan, risks, benefits, and alternatives have been provided, discussed and informed consent has been obtained with: patient.    CODE STATUS:    Code Status (Patient has no pulse and is not breathing): CPR (Attempt to Resuscitate)  Medical Interventions (Patient has pulse or is breathing): Full Support

## 2024-08-03 NOTE — BRIEF OP NOTE
ESOPHAGOGASTRODUODENOSCOPY  Progress Note    David Nugent  8/3/2024    Pre-op Diagnosis:   Coffee ground emesis [K92.0]       Post-Op Diagnosis Codes:     * Coffee ground emesis [K92.0]    Procedure/CPT® Codes:        Procedure(s):  ESOPHAGOGASTRODUODENOSCOPY with balloon dilation #6-8              Surgeon(s):  Skye Elizalde MD    Anesthesia: Monitored Anesthesia Care    Staff:   Endo Technician: Julienne Levi  Endo Nurse: Fatemeh Gresham RN         Estimated Blood Loss: minimal    Urine Voided: * No values recorded between 8/3/2024 11:53 AM and 8/3/2024 12:07 PM *    Specimens:                None          Drains: * No LDAs found *    Findings: Tight distal esophageal stricture was dilated with a balloon to 8 mm.  Able to traverse the stricture after dilation.  The esophagus was mildly to moderately dilated.  Could not assess the LES tone due to presence of stricture.  There was esophageal ulceration at the stricture likely the source of coffee-ground emesis.  Remaining exam was normal.        Complications: No immediate    Recommendations-  -twice daily PPI indefinitely  -Carafate suspension x 2 weeks  -Recommend repeat EGD in about a month for additional dilation.  [facility]  -Full liquid diet for the next 1 to 2 days-can advance to soft solids, low residue thereafter  -Okay to discharge from a GI standpoint          Skye Elizalde MD     Date: 8/3/2024  Time: 12:12 EDT

## 2024-08-03 NOTE — CONSULTS
[facility]  Initial Inpatient Consult Note    Referring Provider: Dr. De Luna    Reason for Consultation: Coffee ground emesis    Subjective     History of present illness:    74 y.o. male, who typically gets his medical care at the [facility], that we are asked to see for coffee-ground emesis.    He reports intermittent vomiting for some time.  He has never had previous bleeding.  He had episodes of coffee-ground emesis that prompted his presentation to the emergency room.  He reports that his heartburn has been a little worse of late.  He takes medication for this at home.  He has a history of achalasia.  He had a last intervention for this about a year and a half ago.  He denies any blood in the stool or melena.    Has a history of chronic vomiting.  He has been treated in the past for achalasia.  He is anticoagulated with Eliquis due to history of PE.  He reports that he has not taken this since Wednesday.    Past Medical History:  Past Medical History:   Diagnosis Date    Constipation     GERD (gastroesophageal reflux disease)     History of pulmonary embolus (PE)     Hypertension      Past Surgical History:  History reviewed. No pertinent surgical history.   Social History:   Social History     Tobacco Use    Smoking status: Never    Smokeless tobacco: Never   Substance Use Topics    Alcohol use: Never      Family History:  History reviewed. No pertinent family history.    Home Meds:  Medications Prior to Admission   Medication Sig Dispense Refill Last Dose    acetaminophen (TYLENOL) 500 MG tablet Take 1 tablet by mouth Every 6 (Six) Hours As Needed for Mild Pain.   8/1/2024    amLODIPine (NORVASC) 10 MG tablet Take 1 tablet by mouth Daily.   8/1/2024 at 1100    fluticasone (FLONASE) 50 MCG/ACT nasal spray 2 sprays into the nostril(s) as directed by provider Daily As Needed for Rhinitis.   Past Month    pantoprazole (PROTONIX) 40 MG EC tablet Take 1 tablet by mouth 2 (Two)  Times a Day Before Meals.   8/1/2024 at 1700    polyethylene glycol (MiraLax) 17 GM/SCOOP powder Take 17 g by mouth 2 (Two) Times a Day. 850 g 0 8/1/2024    senna (SENOKOT) 8.6 MG tablet Take 2 tablets by mouth 2 (Two) Times a Day. 40 tablet 0 8/1/2024    apixaban (ELIQUIS) 5 MG tablet tablet Take 1 tablet by mouth Every 12 (Twelve) Hours. Indications: DVT/PE (active thrombosis) 60 tablet 0 7/30/2024    vitamin D (ERGOCALCIFEROL) 1.25 MG (82152 UT) capsule capsule Take 50,000 Units by mouth 1 (One) Time Per Week. PT TAKES IT EVERY FRIDAY        Current Meds:   amLODIPine, 10 mg, Oral, Daily  polyethylene glycol, 17 g, Oral, BID  senna, 2 tablet, Oral, BID  sodium chloride, 10 mL, Intravenous, Q12H      Allergies:  No Known Allergies  Review of Systems  Pertinent items are noted in HPI     Objective     Vital Signs  Temp:  [97.5 °F (36.4 °C)-98.2 °F (36.8 °C)] 98.1 °F (36.7 °C)  Heart Rate:  [64-94] 70  Resp:  [16-18] 18  BP: ()/(62-92) 135/82  Physical Exam:  General Appearance:    Alert, cooperative, in no acute distress   Head:    Normocephalic, without obvious abnormality, atraumatic   Eyes:          conjunctivae and sclerae normal, no   icterus   Throat:   no thrush, oral mucosa moist   Neck:   Supple, no adenopathy   Lungs:     Clear to auscultation bilaterally    Heart:    Regular rhythm and normal rate    Chest Wall:    No abnormalities observed   Abdomen:     Soft, nondistended, nontender; normal bowel sounds   Extremities:   no edema, no redness   Skin:   No bruising or rash   Psychiatric:  normal mood and insight     Results Review:   I reviewed the patient's new clinical results.    Results from last 7 days   Lab Units 08/03/24  0606 08/03/24  0016 08/02/24  1639 08/02/24  1133   WBC 10*3/mm3  --   --   --  6.68   HEMOGLOBIN g/dL 12.8* 13.2 14.2 14.5   HEMATOCRIT % 37.9 40.1 42.0 43.1   PLATELETS 10*3/mm3  --   --   --  221     Results from last 7 days   Lab Units 08/03/24  0606 08/02/24  1139    SODIUM mmol/L 140 138   POTASSIUM mmol/L 3.8 3.9   CHLORIDE mmol/L 111* 104   CO2 mmol/L 22.0 24.0   BUN mg/dL 12 14   CREATININE mg/dL 1.02 1.29*   CALCIUM mg/dL 8.9 10.3   BILIRUBIN mg/dL  --  1.0   ALK PHOS U/L  --  62   ALT (SGPT) U/L  --  14   AST (SGOT) U/L  --  18   GLUCOSE mg/dL 80 109*         Lab Results   Lab Value Date/Time    LIPASE 31 08/02/2024 1133    LIPASE 23 09/17/2020 1351       Radiology:  XR Chest 1 View   Final Result   No evidence for acute pulmonary process. Follow-up as   clinical indications persist.       This report was finalized on 8/2/2024 10:51 AM by Dr. Simba Olivares M.D on Workstation: [id number]              Assessment & Plan   Active Hospital Problems    Diagnosis     **Coffee ground emesis        Assessment:  Coffee ground emesis  H/o achalasia  Anemia  History of chronic vomiting  History of PE on Eliquis    Plan:  Hemoglobin down slightly overnight but this is likely dilutional.  Recommend EGD today for further evaluation however expect that he will have some irritation from acid reflux and achalasia which is caused his coffee-ground emesis.  Continue pantoprazole drip in the interim.      I discussed the patients findings and my recommendations with patient and nursing staff.          Skye Elizalde M.D.  [facility]  [phone]

## 2024-08-03 NOTE — PLAN OF CARE
Goal Outcome Evaluation:  Plan of Care Reviewed With: patient           Outcome Evaluation: Pt is a 73 yo male admitted with coffee ground emesis. Pt reports independence with functional mobility at baseline without AD and denies falls. Today, pt mobilized well. He completed bed mobility independently, transfers with sba and ambulated 140' sba with RW. No balance or safety deficits observed. No further acute PT needs identified, PT signing off. Anticipate d/c home once medically cleared.      Anticipated Discharge Disposition (PT): home

## 2024-08-03 NOTE — DISCHARGE SUMMARY
Date of Discharge:  8/3/2024    PCP: Provider, No Known    Discharge Diagnosis:   Active Hospital Problems    Diagnosis  POA    **Coffee ground emesis [K92.0]  Yes      Resolved Hospital Problems   No resolved problems to display.     Procedure(s):  ESOPHAGOGASTRODUODENOSCOPY with balloon dilation #6-8  08/03 1140 Upper GI Endoscopy  Consults       Date and Time Order Name Status Description    8/2/2024  3:32 PM Inpatient Gastroenterology Consult      8/2/2024  1:30 PM [facility] (on-call MD unless specified) Details            Hospital Course  74 y.o. male with past medical history significant for DVT, PE, on anticoagulation, hypertension, chronic constipation, presents to the hospital with complaint of blood in emesis.  Patient was evaluated by gastroenterology, patient underwent EGD, findings are consistent with tight distal esophageal stricture which was dilated.  GI recommendations are as follows:  -twice daily PPI indefinitely  -Carafate suspension x 2 weeks  -Recommend repeat EGD in about a month for additional dilation.  [facility]  -Full liquid diet for the next 1 to 2 days-can advance to soft solids, low residue thereafter  -Okay to discharge from a GI standpoint    Patient will be discharged home in a stable condition with and examined patient at bedside, total time spent on discharge management is more than 30 minutes.      Temp:  [97.7 °F (36.5 °C)-98.4 °F (36.9 °C)] 97.7 °F (36.5 °C)  Heart Rate:  [64-74] 64  Resp:  [16-18] 16  BP: (100-182)/(62-93) 128/79  Body mass index is 26.24 kg/m².    Physical Exam  General, awake and alert.  Head and ENT, normocephalic and atraumatic.  Lungs, symmetric expansion, equal air entry bilaterally.  Heart, regular rate and rhythm.  Abdomen, soft and nontender.  Extremities, no clubbing or cyanosis.  Neuro, no focal deficits.  Skin: Warm and no rash.  Psych, normal mood and affect.  Musculoskeletal, joint examination is grossly normal.      Disposition: Home or  Self Care       Discharge Medications        New Medications        Instructions Start Date   sucralfate 1 g tablet  Commonly known as: CARAFATE   Take 1 tablet by mouth 3 (Three) Times a Day Before Meals.             Continue These Medications        Instructions Start Date   acetaminophen 500 MG tablet  Commonly known as: TYLENOL   500 mg, Oral, Every 6 Hours PRN      amLODIPine 10 MG tablet  Commonly known as: NORVASC   10 mg, Oral, Daily      fluticasone 50 MCG/ACT nasal spray  Commonly known as: FLONASE   2 sprays, Nasal, Daily PRN      pantoprazole 40 MG EC tablet  Commonly known as: PROTONIX   40 mg, Oral, 2 Times Daily Before Meals      polyethylene glycol 17 GM/SCOOP powder  Commonly known as: MiraLax   17 g, Oral, 2 Times Daily      Senna-Time 8.6 MG tablet  Generic drug: senna   2 tablets, Oral, 2 Times Daily      vitamin D 1.25 MG (95420 UT) capsule capsule  Commonly known as: ERGOCALCIFEROL   50,000 Units, Oral, Weekly, PT TAKES IT EVERY FRIDAY              Stop These Medications      apixaban 5 MG tablet tablet  Commonly known as: ELIQUIS               Additional Instructions for the Follow-ups that You Need to Schedule       Discharge Follow-up with PCP   As directed       Currently Documented PCP:    Provider, No Known    PCP Phone Number:    [phone]     Follow Up Details: Follow-up with PCP in 7 days.               Follow-up Information       Provider, No Known .    Why: Follow-up with PCP in 7 days.  Contact information:  [facility] [address] [phone]                          No future appointments.     Thompson Napier MD  [facility]  08/03/24    Discharge time spent greater than 30 minutes.

## 2024-08-03 NOTE — THERAPY EVALUATION
Patient Name: David Nugent  : 1950    MRN: [id number]                              Today's Date: 8/3/2024       Admit Date: 2024    Visit Dx:     ICD-10-CM ICD-9-CM   1. Coffee ground emesis  K92.0 578.0   2. Vomiting, unspecified vomiting type, unspecified whether nausea present  R11.10 787.03   3. History of gastroesophageal reflux (GERD)  Z87.19 V12.79   4. Chronic anticoagulation  Z79.01 V58.61   5. Acute renal insufficiency  N28.9 593.9     Patient Active Problem List   Diagnosis    Multiple subsegmental pulmonary emboli without acute cor pulmonale    Pneumonia due to COVID-19 virus    HTN (hypertension)    GERD without esophagitis    Acute deep vein thrombosis of calf, left    Chronic constipation    Nausea & vomiting    Coffee ground emesis     Past Medical History:   Diagnosis Date    Constipation     GERD (gastroesophageal reflux disease)     History of pulmonary embolus (PE)     Hypertension      History reviewed. No pertinent surgical history.   General Information       Row Name 24 1428          Physical Therapy Time and Intention    Document Type discharge evaluation/summary  -CW     Mode of Treatment individual therapy;physical therapy  -CW       Row Name 24 1428          General Information    Patient Profile Reviewed yes  -CW     Prior Level of Function independent:;transfer;all household mobility;bed mobility  no AD  -CW     Existing Precautions/Restrictions fall  -CW     Barriers to Rehab none identified  -CW       Row Name 24 1428          Living Environment    People in Home spouse  -CW       Row Name 24 1428          Cognition    Orientation Status (Cognition) oriented x 4  -CW               User Key  (r) = Recorded By, (t) = Taken By, (c) = Cosigned By      Initials Name Provider Type    CW Eileen Murray PT Physical Therapist                   Mobility       Row Name 24 1428          Bed Mobility    Bed Mobility supine-sit;sit-supine  -CW      Supine-Sit [place] (Bed Mobility) independent  -CW     Sit-Supine [place] (Bed Mobility) independent  -CW       Row Name 08/03/24 1428          Sit-Stand Transfer    Sit-Stand [place] (Transfers) standby assist  -CW       Row Name 08/03/24 1428          Gait/Stairs (Locomotion)    [place] Level (Gait) standby assist  -CW     Distance in Feet (Gait) 140  -CW     Deviations/Abnormal Patterns (Gait) gait speed decreased  -CW     Comment, (Gait/Stairs) no AD, no loss of balance  -CW               User Key  (r) = Recorded By, (t) = Taken By, (c) = Cosigned By      Initials Name Provider Type    Eileen Roa PT Physical Therapist                   Obj/Interventions       Row Name 08/03/24 1523          Range of Motion Comprehensive    General Range of Motion bilateral lower extremity ROM WFL  -CW       [facility] Name 08/03/24 1523          Strength Comprehensive (MMT)    General Manual Muscle Testing (MMT) Assessment no strength deficits identified  -CW       Row Name 08/03/24 1523          Balance    Balance Assessment standing static balance;standing dynamic balance  -CW     Static Standing Balance standby assist  -CW     Dynamic Standing Balance standby assist  -CW     Position/Device Used, Standing Balance unsupported  -CW               User Key  (r) = Recorded By, (t) = Taken By, (c) = Cosigned By      Initials Name Provider Type    Eileen Roa PT Physical Therapist                   Goals/Plan    No documentation.                  Clinical Impression       [facility] Name 08/03/24 1524          Pain    Pretreatment Pain Rating 0/10 - no pain  -CW     Posttreatment Pain Rating 0/10 - no pain  -CW       Row Name 08/03/24 1524          Plan of Care Review    Plan of Care Reviewed With patient  -CW     Outcome Evaluation Pt is a 73 yo male admitted with coffee ground emesis. Pt reports independence with functional mobility at baseline without AD and denies falls. Today, pt mobilized well. He  completed bed mobility independently, transfers with sba and ambulated 140' sba with RW. No balance or safety deficits observed. No further acute PT needs identified, PT signing off. Anticipate d/c home once medically cleared.  -CW       Row Name 08/03/24 1524          Therapy Assessment/Plan (PT)    Criteria for Skilled Interventions Met (PT) no;no problems identified which require skilled intervention  -CW     Therapy Frequency (PT) evaluation only  -CW       Row Name 08/03/24 1524          Vital Signs    O2 Delivery Pre Treatment room air  -CW       Row Name 08/03/24 1524          Positioning and Restraints    Pre-Treatment Position in bed  -CW     Post Treatment Position bed  -CW     In Bed notified nsg;call light within reach;encouraged to call for assist;exit alarm on;supine;side rails up x2  -CW               User Key  (r) = Recorded By, (t) = Taken By, (c) = Cosigned By      Initials Name Provider Type    Eileen Roa, PT Physical Therapist                   Outcome Measures       Row Name 08/03/24 1542 08/03/24 0820       How much help from another person do you currently need...    Turning from your back to your side while in flat bed without using bedrails? 4  -CW 4  -EP    Moving from lying on back to sitting on the side of a flat bed without bedrails? 4  -CW 4  -EP    Moving to and from a bed to a chair (including a wheelchair)? 4  -CW 4  -EP    Standing up from a chair using your arms (e.g., wheelchair, bedside chair)? 4  -CW 4  -EP    Climbing 3-5 steps with a railing? 3  -CW 3  -EP    To walk in hospital room? 4  -CW 4  -EP    AM-PAC 6 Clicks Score (PT) 23  -CW 23  -EP    Highest Level of Mobility Goal 7 --> Walk 25 feet or more  -CW 7 --> Walk 25 feet or more  -EP      Row Name 08/03/24 1542          Functional Assessment    Outcome Measure Options AM-PAC 6 Clicks Basic Mobility (PT)  -CW               User Key  (r) = Recorded By, (t) = Taken By, (c) = Cosigned By      Initials Name Provider  Type    EP Natalia Macario, RN Registered Nurse    Eileen Roa, PT Physical Therapist                                 Physical Therapy Education       Title: PT OT SLP Therapies (Resolved)       Topic: Physical Therapy (Resolved)       Point: Mobility training (Resolved)       Learner Progress:  Not documented in this visit.              Point: Home exercise program (Resolved)       Learner Progress:  Not documented in this visit.              Point: Body mechanics (Resolved)       Learner Progress:  Not documented in this visit.              Point: Precautions (Resolved)       Learner Progress:  Not documented in this visit.                                  PT Recommendation and Plan     Plan of Care Reviewed With: patient  Outcome Evaluation: Pt is a 73 yo male admitted with coffee ground emesis. Pt reports independence with functional mobility at baseline without AD and denies falls. Today, pt mobilized well. He completed bed mobility independently, transfers with sba and ambulated 140' sba with RW. No balance or safety deficits observed. No further acute PT needs identified, PT signing off. Anticipate d/c home once medically cleared.     Time Calculation:         PT Charges       Row Name 08/03/24 1427             Time Calculation    Start Time 0908  -CW      Stop Time 0920  -CW      Time Calculation (min) 12 min  -CW      PT Received On 08/03/24  -CW         Time Calculation- PT    Total Timed Code Minutes- PT 8 minute(s)  -CW         Timed Charges    69697 - PT Therapeutic Activity Minutes 8  -CW         Total Minutes    Timed Charges Total Minutes 8  -CW       Total Minutes 8  -CW                User Key  (r) = Recorded By, (t) = Taken By, (c) = Cosigned By      Initials Name Provider Type    Eileen Roa PT Physical Therapist                  Therapy Charges for Today       Code Description Service Date Service Provider Modifiers Qty    86794949299 HC PT EVAL LOW COMPLEXITY 2 8/3/2024  Eileen Murray, PT GP 1    [id number]  PT THERAPEUTIC ACT EA 15 MIN 8/3/2024 Eileen Murray, PT GP 1            PT G-Codes  Outcome Measure Options: AM-PAC 6 Clicks Basic Mobility (PT)  AM-PAC 6 Clicks Score (PT): 23  PT Discharge Summary  Anticipated Discharge Disposition (PT): home    Eileen Murray PT  8/3/2024

## 2024-08-03 NOTE — ANESTHESIA POSTPROCEDURE EVALUATION
Patient: David Nugent    Procedure Summary       Date: 08/03/24 Room / Location:  [facility]    Anesthesia Start: 1153 Anesthesia Stop: 1208    Procedure: ESOPHAGOGASTRODUODENOSCOPY with balloon dilation #6-8 (Esophagus) Diagnosis:       Coffee ground emesis      (Coffee ground emesis [K92.0])    Surgeons: Skye Elizalde MD Provider: Adonay Retana MD    Anesthesia Type: MAC ASA Status: 3 - Emergent            Anesthesia Type: MAC    Vitals  Vitals Value Taken Time   /79 08/03/24 1228   Temp     Pulse 64 08/03/24 1228   Resp 16 08/03/24 1228   SpO2 96 % 08/03/24 1228           Anesthesia Post Evaluation

## 2024-08-03 NOTE — PLAN OF CARE
Goal Outcome Evaluation:      Patient being discharged this shift after EGD all discharge paperwork being completed, all belongings taken, iv taken out as well.

## 2024-08-13 ENCOUNTER — PREP FOR SURGERY (OUTPATIENT)
Dept: OTHER | Facility: HOSPITAL | Age: 74
End: 2024-08-13
Payer: OTHER GOVERNMENT
